# Patient Record
Sex: FEMALE | Race: WHITE | NOT HISPANIC OR LATINO | Employment: FULL TIME | URBAN - METROPOLITAN AREA
[De-identification: names, ages, dates, MRNs, and addresses within clinical notes are randomized per-mention and may not be internally consistent; named-entity substitution may affect disease eponyms.]

---

## 2018-12-30 ENCOUNTER — APPOINTMENT (OUTPATIENT)
Dept: GENERAL RADIOLOGY | Facility: CLINIC | Age: 38
End: 2018-12-30
Attending: EMERGENCY MEDICINE
Payer: COMMERCIAL

## 2018-12-30 ENCOUNTER — HOSPITAL ENCOUNTER (EMERGENCY)
Facility: CLINIC | Age: 38
Discharge: HOME | End: 2018-12-30
Attending: EMERGENCY MEDICINE | Admitting: EMERGENCY MEDICINE
Payer: COMMERCIAL

## 2018-12-30 VITALS
OXYGEN SATURATION: 94 % | BODY MASS INDEX: 43.54 KG/M2 | HEIGHT: 64 IN | TEMPERATURE: 99.1 F | DIASTOLIC BLOOD PRESSURE: 77 MMHG | SYSTOLIC BLOOD PRESSURE: 122 MMHG | RESPIRATION RATE: 34 BRPM | WEIGHT: 255 LBS | HEART RATE: 78 BPM

## 2018-12-30 DIAGNOSIS — J18.9 COMMUNITY ACQUIRED PNEUMONIA OF LEFT LOWER LOBE OF LUNG: ICD-10-CM

## 2018-12-30 LAB
ANION GAP SERPL CALCULATED.3IONS-SCNC: 8 MMOL/L (ref 3–14)
BASOPHILS # BLD AUTO: 0 10E9/L (ref 0–0.2)
BASOPHILS NFR BLD AUTO: 0.1 %
BUN SERPL-MCNC: 5 MG/DL (ref 7–30)
CALCIUM SERPL-MCNC: 9.1 MG/DL (ref 8.5–10.1)
CHLORIDE SERPL-SCNC: 101 MMOL/L (ref 94–109)
CO2 SERPL-SCNC: 29 MMOL/L (ref 20–32)
CREAT SERPL-MCNC: 0.8 MG/DL (ref 0.52–1.04)
DIFFERENTIAL METHOD BLD: NORMAL
DIFFERENTIAL METHOD BLD: NORMAL
EOSINOPHIL # BLD AUTO: 0.1 10E9/L (ref 0–0.7)
EOSINOPHIL NFR BLD AUTO: 1.8 %
ERYTHROCYTE [DISTWIDTH] IN BLOOD BY AUTOMATED COUNT: 12.9 % (ref 10–15)
ERYTHROCYTE [DISTWIDTH] IN BLOOD BY AUTOMATED COUNT: NORMAL % (ref 10–15)
FLUAV+FLUBV AG SPEC QL: NEGATIVE
FLUAV+FLUBV AG SPEC QL: NEGATIVE
GFR SERPL CREATININE-BSD FRML MDRD: >90 ML/MIN/{1.73_M2}
GLUCOSE SERPL-MCNC: 94 MG/DL (ref 70–99)
HCG UR QL: NEGATIVE
HCT VFR BLD AUTO: 38.9 % (ref 35–47)
HCT VFR BLD AUTO: NORMAL % (ref 35–47)
HGB BLD-MCNC: 13.3 G/DL (ref 11.7–15.7)
HGB BLD-MCNC: NORMAL G/DL (ref 11.7–15.7)
IMM GRANULOCYTES # BLD: 0 10E9/L (ref 0–0.4)
IMM GRANULOCYTES NFR BLD: 0.4 %
LACTATE SERPL-SCNC: 0.7 MMOL/L (ref 0.4–2)
LYMPHOCYTES # BLD AUTO: 2 10E9/L (ref 0.8–5.3)
LYMPHOCYTES NFR BLD AUTO: 29.5 %
MCH RBC QN AUTO: 28.1 PG (ref 26.5–33)
MCH RBC QN AUTO: NORMAL PG (ref 26.5–33)
MCHC RBC AUTO-ENTMCNC: 34.2 G/DL (ref 31.5–36.5)
MCHC RBC AUTO-ENTMCNC: NORMAL G/DL (ref 31.5–36.5)
MCV RBC AUTO: 82 FL (ref 78–100)
MCV RBC AUTO: NORMAL FL (ref 78–100)
MONOCYTES # BLD AUTO: 0.6 10E9/L (ref 0–1.3)
MONOCYTES NFR BLD AUTO: 9.5 %
NEUTROPHILS # BLD AUTO: 4 10E9/L (ref 1.6–8.3)
NEUTROPHILS NFR BLD AUTO: 58.7 %
NRBC # BLD AUTO: 0 10*3/UL
NRBC BLD AUTO-RTO: 0 /100
PLATELET # BLD AUTO: 189 10E9/L (ref 150–450)
PLATELET # BLD AUTO: NORMAL 10E9/L (ref 150–450)
POTASSIUM SERPL-SCNC: 3.8 MMOL/L (ref 3.4–5.3)
PROCALCITONIN SERPL-MCNC: 0.07 NG/ML
RBC # BLD AUTO: 4.73 10E12/L (ref 3.8–5.2)
RBC # BLD AUTO: NORMAL 10E12/L (ref 3.8–5.2)
SODIUM SERPL-SCNC: 138 MMOL/L (ref 133–144)
SPECIMEN SOURCE: NORMAL
WBC # BLD AUTO: 6.8 10E9/L (ref 4–11)
WBC # BLD AUTO: NORMAL 10E9/L (ref 4–11)

## 2018-12-30 PROCEDURE — 84145 PROCALCITONIN (PCT): CPT | Performed by: EMERGENCY MEDICINE

## 2018-12-30 PROCEDURE — 96365 THER/PROPH/DIAG IV INF INIT: CPT

## 2018-12-30 PROCEDURE — 85025 COMPLETE CBC W/AUTO DIFF WBC: CPT | Performed by: EMERGENCY MEDICINE

## 2018-12-30 PROCEDURE — 87804 INFLUENZA ASSAY W/OPTIC: CPT | Performed by: EMERGENCY MEDICINE

## 2018-12-30 PROCEDURE — 81025 URINE PREGNANCY TEST: CPT | Performed by: EMERGENCY MEDICINE

## 2018-12-30 PROCEDURE — 25000128 H RX IP 250 OP 636: Performed by: EMERGENCY MEDICINE

## 2018-12-30 PROCEDURE — 36415 COLL VENOUS BLD VENIPUNCTURE: CPT

## 2018-12-30 PROCEDURE — 71046 X-RAY EXAM CHEST 2 VIEWS: CPT

## 2018-12-30 PROCEDURE — 99284 EMERGENCY DEPT VISIT MOD MDM: CPT | Mod: 25

## 2018-12-30 PROCEDURE — 25000132 ZZH RX MED GY IP 250 OP 250 PS 637: Performed by: EMERGENCY MEDICINE

## 2018-12-30 PROCEDURE — 80048 BASIC METABOLIC PNL TOTAL CA: CPT | Performed by: EMERGENCY MEDICINE

## 2018-12-30 PROCEDURE — 83605 ASSAY OF LACTIC ACID: CPT | Performed by: EMERGENCY MEDICINE

## 2018-12-30 PROCEDURE — 94640 AIRWAY INHALATION TREATMENT: CPT

## 2018-12-30 PROCEDURE — 87040 BLOOD CULTURE FOR BACTERIA: CPT | Performed by: EMERGENCY MEDICINE

## 2018-12-30 PROCEDURE — 25000125 ZZHC RX 250: Performed by: EMERGENCY MEDICINE

## 2018-12-30 RX ORDER — SODIUM CHLORIDE 9 MG/ML
1000 INJECTION, SOLUTION INTRAVENOUS CONTINUOUS
Status: DISCONTINUED | OUTPATIENT
Start: 2018-12-30 | End: 2018-12-30 | Stop reason: HOSPADM

## 2018-12-30 RX ORDER — LEVOFLOXACIN 5 MG/ML
750 INJECTION, SOLUTION INTRAVENOUS ONCE
Status: COMPLETED | OUTPATIENT
Start: 2018-12-30 | End: 2018-12-30

## 2018-12-30 RX ORDER — FLUCONAZOLE 150 MG/1
150 TABLET ORAL ONCE
Status: COMPLETED | OUTPATIENT
Start: 2018-12-30 | End: 2018-12-30

## 2018-12-30 RX ORDER — CEFUROXIME AXETIL 500 MG/1
500 TABLET ORAL 2 TIMES DAILY
Status: ON HOLD | COMMUNITY
End: 2024-04-09

## 2018-12-30 RX ORDER — BENZONATATE 200 MG/1
200 CAPSULE ORAL 3 TIMES DAILY PRN
Qty: 30 CAPSULE | Refills: 0 | Status: SHIPPED | OUTPATIENT
Start: 2018-12-30 | End: 2019-01-06

## 2018-12-30 RX ORDER — ALBUTEROL SULFATE 90 UG/1
2 AEROSOL, METERED RESPIRATORY (INHALATION) EVERY 6 HOURS
COMMUNITY

## 2018-12-30 RX ORDER — IPRATROPIUM BROMIDE AND ALBUTEROL SULFATE 2.5; .5 MG/3ML; MG/3ML
3 SOLUTION RESPIRATORY (INHALATION) ONCE
Status: COMPLETED | OUTPATIENT
Start: 2018-12-30 | End: 2018-12-30

## 2018-12-30 RX ORDER — LEVOFLOXACIN 500 MG/1
500 TABLET, FILM COATED ORAL DAILY
Qty: 10 TABLET | Refills: 0 | Status: SHIPPED | OUTPATIENT
Start: 2018-12-30 | End: 2019-01-09

## 2018-12-30 RX ADMIN — FLUCONAZOLE 150 MG: 150 TABLET ORAL at 18:38

## 2018-12-30 RX ADMIN — IPRATROPIUM BROMIDE AND ALBUTEROL SULFATE 3 ML: .5; 2.5 SOLUTION RESPIRATORY (INHALATION) at 17:09

## 2018-12-30 RX ADMIN — LEVOFLOXACIN 750 MG: 5 INJECTION, SOLUTION INTRAVENOUS at 17:41

## 2018-12-30 RX ADMIN — SODIUM CHLORIDE 1000 ML: 9 INJECTION, SOLUTION INTRAVENOUS at 17:40

## 2018-12-30 ASSESSMENT — ENCOUNTER SYMPTOMS
ROS GI COMMENTS: POST TUSSIVE EMESIS
COUGH: 1
SHORTNESS OF BREATH: 1
SORE THROAT: 1
TROUBLE SWALLOWING: 0
FEVER: 1
VOICE CHANGE: 1
MYALGIAS: 0
NAUSEA: 0

## 2018-12-30 ASSESSMENT — MIFFLIN-ST. JEOR: SCORE: 1816.67

## 2018-12-30 NOTE — ED AVS SNAPSHOT
Emergency Department  [address] [place] [id number]  Phone:  [phone]  Fax:  [phone]                                    Shannon Calderon   MRN: [id number]    Department:   Emergency Department   Date of Visit:  12/30/2018           After Visit Summary Signature Page    I have received my discharge instructions, and my questions have been answered. I have discussed any challenges I see with this plan with the nurse or doctor.    ..........................................................................................................................................  Patient/Patient Representative Signature      ..........................................................................................................................................  Patient Representative Print Name and Relationship to Patient    ..................................................               ................................................  Date                                   Time    ..........................................................................................................................................  Reviewed by Signature/Title    ...................................................              ..............................................  Date                                               Time          22EPIC Rev 08/18

## 2018-12-30 NOTE — ED PROVIDER NOTES
History     Chief Complaint:  Cough     The history is provided by the patient.      Shannon Calderon is a otherwise healthy 38 year old female who presents with ongoing cough for the past 7 days. The patient states that 7 days ago, she developed a fever of 103 and a cough, which prompted her to present to Urgent care. A chest Xray at this time indicated pneumonia, and she was started on a course of cefuroxime. Since then, the patient states that her symptoms have not been improving, despite taking the antibiotics as instructed. The patient reports continued intermittent fevers of 101-102. She states that her throat feels raw from coughing and she has developed a hoarse voice over the past few days. She reports that she is becoming incontinent of urine when coughing, and she feels like she may be getting a yeast infection. She states that she has been feeling short of breath when walking about, as well as when she has severe coughing fits. The patient reports a few episodes of post tussive emesis. The patient denies recent known contact with other sick persons. She denies history of smoking or asthma.    Allergies:  No known drug allergies      Medications:    Albuterol   Cefuroxime    Past Medical History:    The patient does not have any past pertinent medical history.     Past Surgical History:    History reviewed. No pertinent surgical history.     Family History:    History reviewed. No pertinent family history.      Social History:  The patient presents to the ED with   Smoking status: No     Review of Systems   Constitutional: Positive for fever.   HENT: Positive for sore throat and voice change. Negative for ear pain and trouble swallowing.    Respiratory: Positive for cough and shortness of breath.    Cardiovascular: Negative for chest pain.   Gastrointestinal: Negative for nausea.        Post tussive emesis    Musculoskeletal: Negative for myalgias.   All other systems reviewed and are  "negative.      Physical Exam     Patient Vitals for the past 24 hrs:   BP Temp Temp src Pulse Heart Rate Resp SpO2 Height Weight   12/30/18 1745 122/77 99.1  F (37.3  C) Oral 78 79 (!) 34 94 % -- --   12/30/18 1614 132/74 -- -- 76 76 17 94 % -- --   12/30/18 1544 -- 99.4  F (37.4  C) Oral 86 86 17 95 % -- --   12/30/18 1400 149/84 99.6  F (37.6  C) Oral 100 -- 18 95 % 1.626 m (5' 4\") 115.7 kg (255 lb)        Physical Exam  Gen: Non-toxic, accompanied by significant other.  Frequent cough.    Eye:   Pupils are equal, round, and reactive.     Sclera non-injected.    ENT:   Moist mucus membranes.     Normal tongue.    Oropharynx without lesions.    Cardiac:     Normal rate and regular rhythm.    No murmurs, gallops, or rubs.    Pulmonary:     Freqeunt dry cough.   Poor air movement.     Abdomen:     Normal active bowel sounds.     Abdomen is soft and non-distended, without focal tenderness.    Musculoskeletal:     Normal movement of all extremities without evidence for deficit.    Extremities:    No edema.    Skin:   Warm and dry.    Neurologic:    Non-focal exam without asymmetric weakness or numbness.    Normal tone    Psychiatric:     Normal affect with appropriate interaction with examiner.     Emergency Department Course     Imaging:  Radiographic findings were communicated with the patient who voiced understanding of the findings.    XR Chest 2 Views  Impression: Left lower lobe pneumonia.  As read by Radiology.     Laboratory:  Labs Ordered and Resulted from Time of ED Arrival Up to the Time of Departure from the ED   BASIC METABOLIC PANEL - Abnormal; Notable for the following components:       Result Value    Urea Nitrogen 5 (*)     All other components within normal limits   CBC WITH PLATELETS DIFFERENTIAL   HCG QUALITATIVE URINE   LACTIC ACID   CBC WITH PLATELETS DIFFERENTIAL   PROCALCITONIN   PERIPHERAL IV CATHETER   INFLUENZA A/B ANTIGEN   BLOOD CULTURE       Interventions:  1709: Duoneb, 3 mL, nebulization "    1740: NS 1L IV Bolus   1741: levofloxacin infusion 750 mg, IV    Emergency Department Course:  Past medical records, nursing notes, and vitals reviewed.  1555: I performed an exam of the patient and obtained history, as documented above.  IV inserted and blood drawn.   The patient was sent for a Xray while in the emergency department, findings above.       1649: I rechecked the patient. Explained findings to the patient.    1810: I briefly discussed the patient with the hospitalist.  Given she does not appear toxic, has reassuring labs, normal vitals, and received coverage only with cefuroxime, will plan to discharge home on levaquin rather than bring in for observation.  The patient agrees with this plan, and would prefer to be at home.      Impression & Plan      Medical Decision Making:  Shannon Calderon is a 38 year old female who was started on treatment for community acquired pneumonia several days ago.  Symptoms have continued with fevers and productive cough. Chest radiograph demonstrates the left lower lobe pneumonia. She was initially slightly tachycardic with borderline oxygen saturation, but otherwise no hypotension. There is no evidence for severe sepsis at this time.  Labs are reassuring as are vitals.  Given initial partial treatment with cefuroxime, will plan to broaden coverage with levaquin and stop cefuroxime.  Patient is a good candidate for outpatient treatment, as she is otherwise healthy, tolerating oral fluids, with stable vitals and normal oxygenation.  We discussed observation in the hospital versus management of symptoms at home.  She prefers to return home.  She will return to the ED for increased shortness of breath, persistent fever, failure to improve, or any other concerns.    Diagnosis:    ICD-10-CM    1. Community acquired pneumonia of left lower lobe of lung (H) J18.1 Blood culture     Blood culture       Disposition:  Discharge to home.    Discharged with levofloxacin 500mg  po daily for 10 days and tessalon perles.    Megan Beh  12/30/2018    EMERGENCY DEPARTMENT  I, Megan Beh, am serving as a scribe at 3:55 PM on 12/30/2018 to document services personally performed by Vonnie Smith MD based on my observations and the provider's statements to me.       Vonnie Smith MD  12/31/18 0644

## 2018-12-31 NOTE — ED NOTES
"[facility]  ED Nurse Handoff Report    ED Chief complaint: Cough (x 1 week. seen at u/c. placed on antibiotics for pneumonia. last advil last night. urinary incontinence when she coughs. possible yeast infection too)      ED Diagnosis:   Final diagnoses:   None       Code Status: Full Code    Allergies: No Known Allergies    Activity level - Baseline/Home:  Independent    Activity Level - Current:   Independent     Needed?: No    Isolation: No  Infection: Not Applicable  Bariatric?: No    Vital Signs:   Vitals:    12/30/18 1400 12/30/18 1544 12/30/18 1614 12/30/18 1745   BP: 149/84  132/74 122/77   Pulse: 100 86 76 78   Resp: 18 17 17 (!) 34   Temp: 99.6  F (37.6  C) 99.4  F (37.4  C)  99.1  F (37.3  C)   TempSrc: Oral Oral  Oral   SpO2: 95% 95% 94% 94%   Weight: 115.7 kg (255 lb)      Height: 1.626 m (5' 4\")          Cardiac Rhythm: ,   Cardiac  Cardiac Rhythm: Normal sinus rhythm    Pain level:      Is this patient confused?: No   Does this patient have a guardian?  No         If yes, is there guardianship documents in the Epic \"Code/ACP\" activity?  N/A         Guardian Notified?  N/A  [place] - Suicide Severity Rating Scale Completed?  Yes  If yes, what color did the patient score?  White    Patient Report: Initial Complaint: cough  Focused Assessment: left lower lobe pneumonia- tried PO antibiotics X 1 week with no relief of symptoms. Pt has cough, low grade temp. Reports SOB on exertion.   Tests Performed:   Results for orders placed or performed during the hospital encounter of 12/30/18   XR Chest 2 Views    Narrative    XR CHEST 2 VW 12/30/2018 4:36 PM    HISTORY: Cough and fever.    COMPARISON: None.    FINDINGS: Left basilar consolidation. Right lung is clear. No effusion  or pneumothorax. Normal heart size.      Impression    IMPRESSION: Left lower lobe pneumonia.    NIKKI TORREZ MD   CBC with platelets differential   Result Value Ref Range    WBC Canceled, Test credited 4.0 - " 11.0 10e9/L    RBC Count Canceled, Test credited 3.8 - 5.2 10e12/L    Hemoglobin Canceled, Test credited 11.7 - 15.7 g/dL    Hematocrit Canceled, Test credited 35.0 - 47.0 %    MCV Canceled, Test credited 78 - 100 fl    MCH Canceled, Test credited 26.5 - 33.0 pg    MCHC Canceled, Test credited 31.5 - 36.5 g/dL    RDW Canceled, Test credited 10.0 - 15.0 %    Platelet Count Canceled, Test credited 150 - 450 10e9/L    Diff Method Canceled, Test credited    HCG qualitative urine   Result Value Ref Range    HCG Qual Urine Negative NEG^Negative   Lactic acid   Result Value Ref Range    Lactic Acid 0.7 0.4 - 2.0 mmol/L   CBC with platelets differential   Result Value Ref Range    WBC 6.8 4.0 - 11.0 10e9/L    RBC Count 4.73 3.8 - 5.2 10e12/L    Hemoglobin 13.3 11.7 - 15.7 g/dL    Hematocrit 38.9 35.0 - 47.0 %    MCV 82 78 - 100 fl    MCH 28.1 26.5 - 33.0 pg    MCHC 34.2 31.5 - 36.5 g/dL    RDW 12.9 10.0 - 15.0 %    Platelet Count 189 150 - 450 10e9/L    Diff Method Automated Method     % Neutrophils 58.7 %    % Lymphocytes 29.5 %    % Monocytes 9.5 %    % Eosinophils 1.8 %    % Basophils 0.1 %    % Immature Granulocytes 0.4 %    Nucleated RBCs 0 0 /100    Absolute Neutrophil 4.0 1.6 - 8.3 10e9/L    Absolute Lymphocytes 2.0 0.8 - 5.3 10e9/L    Absolute Monocytes 0.6 0.0 - 1.3 10e9/L    Absolute Eosinophils 0.1 0.0 - 0.7 10e9/L    Absolute Basophils 0.0 0.0 - 0.2 10e9/L    Abs Immature Granulocytes 0.0 0 - 0.4 10e9/L    Absolute Nucleated RBC 0.0    Basic metabolic panel   Result Value Ref Range    Sodium 138 133 - 144 mmol/L    Potassium 3.8 3.4 - 5.3 mmol/L    Chloride 101 94 - 109 mmol/L    Carbon Dioxide 29 20 - 32 mmol/L    Anion Gap 8 3 - 14 mmol/L    Glucose 94 70 - 99 mg/dL    Urea Nitrogen 5 (L) 7 - 30 mg/dL    Creatinine 0.80 0.52 - 1.04 mg/dL    GFR Estimate >90 >60 mL/min/[1.73_m2]    GFR Estimate If Black >90 >60 mL/min/[1.73_m2]    Calcium 9.1 8.5 - 10.1 mg/dL   Influenza A/B antigen   Result Value Ref Range     Influenza A/B Agn Specimen Nasal     Influenza A Negative NEG^Negative    Influenza B Negative NEG^Negative     Abnormal Results: see above  Treatments provided: see MAR    Family Comments:     OBS brochure/video discussed/provided to patient/family: Yes              Name of person given brochure if not patient:               Relationship to patient:     ED Medications:   Medications   0.9% sodium chloride BOLUS (1,000 mLs Intravenous New Bag 12/30/18 1740)     Followed by   sodium chloride 0.9% infusion (not administered)   levofloxacin (LEVAQUIN) infusion 750 mg (750 mg Intravenous New Bag 12/30/18 1744)   ipratropium - albuterol 0.5 mg/2.5 mg/3 mL (DUONEB) neb solution 3 mL (3 mLs Nebulization Given 12/30/18 1709)       Drips infusing?:  Yes    For the majority of the shift this patient was Green.   Interventions performed were N/A.    Severe Sepsis OR Septic Shock Diagnosis Present: No    To be done/followed up on inpatient unit:  none at  This time    ED NURSE PHONE NUMBER: [phone]

## 2019-01-05 LAB
BACTERIA SPEC CULT: NO GROWTH
BACTERIA SPEC CULT: NO GROWTH
Lab: NORMAL
Lab: NORMAL
SPECIMEN SOURCE: NORMAL
SPECIMEN SOURCE: NORMAL

## 2024-04-09 ENCOUNTER — HOSPITAL ENCOUNTER (OUTPATIENT)
Facility: CLINIC | Age: 44
Setting detail: OBSERVATION
Discharge: HOME | End: 2024-04-10
Attending: EMERGENCY MEDICINE | Admitting: SURGERY
Payer: COMMERCIAL

## 2024-04-09 ENCOUNTER — APPOINTMENT (OUTPATIENT)
Dept: CT IMAGING | Facility: CLINIC | Age: 44
End: 2024-04-09
Attending: EMERGENCY MEDICINE
Payer: COMMERCIAL

## 2024-04-09 ENCOUNTER — ANESTHESIA EVENT (OUTPATIENT)
Dept: SURGERY | Facility: CLINIC | Age: 44
End: 2024-04-09
Payer: COMMERCIAL

## 2024-04-09 ENCOUNTER — ANESTHESIA (OUTPATIENT)
Dept: SURGERY | Facility: CLINIC | Age: 44
End: 2024-04-09
Payer: COMMERCIAL

## 2024-04-09 DIAGNOSIS — Z87.19 S/P HERNIA REPAIR: ICD-10-CM

## 2024-04-09 DIAGNOSIS — K46.0 INCARCERATED HERNIA: ICD-10-CM

## 2024-04-09 DIAGNOSIS — K42.0 STRANGULATED UMBILICAL HERNIA: Primary | ICD-10-CM

## 2024-04-09 DIAGNOSIS — Z98.890 S/P HERNIA REPAIR: ICD-10-CM

## 2024-04-09 DIAGNOSIS — R10.84 GENERALIZED ABDOMINAL PAIN: ICD-10-CM

## 2024-04-09 LAB
ALBUMIN SERPL BCG-MCNC: 4.5 G/DL (ref 3.5–5.2)
ALBUMIN UR-MCNC: 50 MG/DL
ALP SERPL-CCNC: 86 U/L (ref 40–150)
ALT SERPL W P-5'-P-CCNC: 18 U/L (ref 0–50)
ANION GAP SERPL CALCULATED.3IONS-SCNC: 19 MMOL/L (ref 7–15)
APPEARANCE UR: CLEAR
AST SERPL W P-5'-P-CCNC: 27 U/L (ref 0–45)
BASOPHILS # BLD AUTO: 0 10E3/UL (ref 0–0.2)
BASOPHILS NFR BLD AUTO: 0 %
BILIRUB DIRECT SERPL-MCNC: <0.2 MG/DL (ref 0–0.3)
BILIRUB SERPL-MCNC: 0.6 MG/DL
BILIRUB UR QL STRIP: NEGATIVE
BUN SERPL-MCNC: 14.3 MG/DL (ref 6–20)
CALCIUM SERPL-MCNC: 9.7 MG/DL (ref 8.6–10)
CHLORIDE SERPL-SCNC: 98 MMOL/L (ref 98–107)
COLOR UR AUTO: YELLOW
CREAT SERPL-MCNC: 0.74 MG/DL (ref 0.51–0.95)
CREAT SERPL-MCNC: 0.86 MG/DL (ref 0.51–0.95)
DEPRECATED HCO3 PLAS-SCNC: 21 MMOL/L (ref 22–29)
EGFRCR SERPLBLD CKD-EPI 2021: 85 ML/MIN/1.73M2
EGFRCR SERPLBLD CKD-EPI 2021: >90 ML/MIN/1.73M2
EOSINOPHIL # BLD AUTO: 0.2 10E3/UL (ref 0–0.7)
EOSINOPHIL NFR BLD AUTO: 1 %
ERYTHROCYTE [DISTWIDTH] IN BLOOD BY AUTOMATED COUNT: 13.7 % (ref 10–15)
GLUCOSE SERPL-MCNC: 116 MG/DL (ref 70–99)
GLUCOSE UR STRIP-MCNC: NEGATIVE MG/DL
HCG SERPL QL: NEGATIVE
HCO3 BLDV-SCNC: 26 MMOL/L (ref 21–28)
HCT VFR BLD AUTO: 44.6 % (ref 35–47)
HGB BLD-MCNC: 15.1 G/DL (ref 11.7–15.7)
HGB UR QL STRIP: NEGATIVE
HOLD SPECIMEN: NORMAL
IMM GRANULOCYTES # BLD: 0.1 10E3/UL
IMM GRANULOCYTES NFR BLD: 1 %
KETONES UR STRIP-MCNC: 80 MG/DL
LACTATE BLD-SCNC: 1.6 MMOL/L
LEUKOCYTE ESTERASE UR QL STRIP: NEGATIVE
LIPASE SERPL-CCNC: 33 U/L (ref 13–60)
LYMPHOCYTES # BLD AUTO: 3.5 10E3/UL (ref 0.8–5.3)
LYMPHOCYTES NFR BLD AUTO: 29 %
MCH RBC QN AUTO: 27.9 PG (ref 26.5–33)
MCHC RBC AUTO-ENTMCNC: 33.9 G/DL (ref 31.5–36.5)
MCV RBC AUTO: 82 FL (ref 78–100)
MONOCYTES # BLD AUTO: 0.7 10E3/UL (ref 0–1.3)
MONOCYTES NFR BLD AUTO: 6 %
MUCOUS THREADS #/AREA URNS LPF: PRESENT /LPF
NEUTROPHILS # BLD AUTO: 7.4 10E3/UL (ref 1.6–8.3)
NEUTROPHILS NFR BLD AUTO: 63 %
NITRATE UR QL: NEGATIVE
NRBC # BLD AUTO: 0 10E3/UL
NRBC BLD AUTO-RTO: 0 /100
PCO2 BLDV: 26 MM HG (ref 40–50)
PH BLDV: 7.6 [PH] (ref 7.32–7.43)
PH UR STRIP: 8.5 [PH] (ref 5–7)
PLATELET # BLD AUTO: 340 10E3/UL (ref 150–450)
PO2 BLDV: 19 MM HG (ref 25–47)
POTASSIUM SERPL-SCNC: 3.6 MMOL/L (ref 3.4–5.3)
PROT SERPL-MCNC: 7.9 G/DL (ref 6.4–8.3)
RBC # BLD AUTO: 5.41 10E6/UL (ref 3.8–5.2)
RBC URINE: <1 /HPF
SAO2 % BLDV: 42 % (ref 70–75)
SODIUM SERPL-SCNC: 138 MMOL/L (ref 135–145)
SP GR UR STRIP: 1.01 (ref 1–1.03)
SQUAMOUS EPITHELIAL: 5 /HPF
UROBILINOGEN UR STRIP-MCNC: NORMAL MG/DL
WBC # BLD AUTO: 11.8 10E3/UL (ref 4–11)
WBC URINE: 1 /HPF

## 2024-04-09 PROCEDURE — 250N000011 HC RX IP 250 OP 636: Performed by: EMERGENCY MEDICINE

## 2024-04-09 PROCEDURE — 250N000009 HC RX 250

## 2024-04-09 PROCEDURE — 250N000011 HC RX IP 250 OP 636

## 2024-04-09 PROCEDURE — 99223 1ST HOSP IP/OBS HIGH 75: CPT | Mod: 57 | Performed by: SURGERY

## 2024-04-09 PROCEDURE — G0378 HOSPITAL OBSERVATION PER HR: HCPCS

## 2024-04-09 PROCEDURE — 250N000013 HC RX MED GY IP 250 OP 250 PS 637: Performed by: SURGERY

## 2024-04-09 PROCEDURE — 85004 AUTOMATED DIFF WBC COUNT: CPT | Performed by: EMERGENCY MEDICINE

## 2024-04-09 PROCEDURE — 96361 HYDRATE IV INFUSION ADD-ON: CPT | Mod: 59

## 2024-04-09 PROCEDURE — 250N000011 HC RX IP 250 OP 636: Performed by: SURGERY

## 2024-04-09 PROCEDURE — 250N000009 HC RX 250: Performed by: ANESTHESIOLOGY

## 2024-04-09 PROCEDURE — 250N000013 HC RX MED GY IP 250 OP 250 PS 637: Performed by: EMERGENCY MEDICINE

## 2024-04-09 PROCEDURE — 250N000025 HC SEVOFLURANE, PER MIN: Performed by: SURGERY

## 2024-04-09 PROCEDURE — 80053 COMPREHEN METABOLIC PANEL: CPT | Performed by: EMERGENCY MEDICINE

## 2024-04-09 PROCEDURE — 370N000017 HC ANESTHESIA TECHNICAL FEE, PER MIN: Performed by: SURGERY

## 2024-04-09 PROCEDURE — 258N000003 HC RX IP 258 OP 636: Performed by: SURGERY

## 2024-04-09 PROCEDURE — 83690 ASSAY OF LIPASE: CPT | Performed by: EMERGENCY MEDICINE

## 2024-04-09 PROCEDURE — 36415 COLL VENOUS BLD VENIPUNCTURE: CPT | Performed by: SURGERY

## 2024-04-09 PROCEDURE — 74177 CT ABD & PELVIS W/CONTRAST: CPT

## 2024-04-09 PROCEDURE — 250N000013 HC RX MED GY IP 250 OP 250 PS 637: Performed by: ANESTHESIOLOGY

## 2024-04-09 PROCEDURE — 96375 TX/PRO/DX INJ NEW DRUG ADDON: CPT

## 2024-04-09 PROCEDURE — 84703 CHORIONIC GONADOTROPIN ASSAY: CPT | Performed by: EMERGENCY MEDICINE

## 2024-04-09 PROCEDURE — 82803 BLOOD GASES ANY COMBINATION: CPT

## 2024-04-09 PROCEDURE — 88305 TISSUE EXAM BY PATHOLOGIST: CPT | Mod: TC | Performed by: SURGERY

## 2024-04-09 PROCEDURE — 710N000009 HC RECOVERY PHASE 1, LEVEL 1, PER MIN: Performed by: SURGERY

## 2024-04-09 PROCEDURE — 258N000003 HC RX IP 258 OP 636

## 2024-04-09 PROCEDURE — 99285 EMERGENCY DEPT VISIT HI MDM: CPT | Mod: 25

## 2024-04-09 PROCEDURE — 96376 TX/PRO/DX INJ SAME DRUG ADON: CPT | Mod: 59

## 2024-04-09 PROCEDURE — 250N000011 HC RX IP 250 OP 636: Performed by: ANESTHESIOLOGY

## 2024-04-09 PROCEDURE — 49594 RPR AA HRN 1ST 3-10 NCR/STRN: CPT | Mod: AS | Performed by: PHYSICIAN ASSISTANT

## 2024-04-09 PROCEDURE — 360N000075 HC SURGERY LEVEL 2, PER MIN: Performed by: SURGERY

## 2024-04-09 PROCEDURE — 82565 ASSAY OF CREATININE: CPT | Mod: 91 | Performed by: SURGERY

## 2024-04-09 PROCEDURE — 96374 THER/PROPH/DIAG INJ IV PUSH: CPT | Mod: 59

## 2024-04-09 PROCEDURE — 250N000009 HC RX 250: Performed by: EMERGENCY MEDICINE

## 2024-04-09 PROCEDURE — 999N000141 HC STATISTIC PRE-PROCEDURE NURSING ASSESSMENT: Performed by: SURGERY

## 2024-04-09 PROCEDURE — 272N000001 HC OR GENERAL SUPPLY STERILE: Performed by: SURGERY

## 2024-04-09 PROCEDURE — 81001 URINALYSIS AUTO W/SCOPE: CPT | Performed by: EMERGENCY MEDICINE

## 2024-04-09 PROCEDURE — 36415 COLL VENOUS BLD VENIPUNCTURE: CPT | Performed by: EMERGENCY MEDICINE

## 2024-04-09 PROCEDURE — 49594 RPR AA HRN 1ST 3-10 NCR/STRN: CPT | Performed by: SURGERY

## 2024-04-09 RX ORDER — MORPHINE SULFATE 4 MG/ML
4 INJECTION, SOLUTION INTRAMUSCULAR; INTRAVENOUS ONCE
Status: COMPLETED | OUTPATIENT
Start: 2024-04-09 | End: 2024-04-09

## 2024-04-09 RX ORDER — HYDROCHLOROTHIAZIDE 25 MG/1
1 TABLET ORAL DAILY
COMMUNITY
Start: 2023-08-04

## 2024-04-09 RX ORDER — DEXAMETHASONE SODIUM PHOSPHATE 4 MG/ML
INJECTION, SOLUTION INTRA-ARTICULAR; INTRALESIONAL; INTRAMUSCULAR; INTRAVENOUS; SOFT TISSUE PRN
Status: DISCONTINUED | OUTPATIENT
Start: 2024-04-09 | End: 2024-04-09

## 2024-04-09 RX ORDER — OXYCODONE HYDROCHLORIDE 5 MG/1
10 TABLET ORAL EVERY 4 HOURS PRN
Status: DISCONTINUED | OUTPATIENT
Start: 2024-04-09 | End: 2024-04-10 | Stop reason: HOSPADM

## 2024-04-09 RX ORDER — HYDROMORPHONE HYDROCHLORIDE 1 MG/ML
0.5 INJECTION, SOLUTION INTRAMUSCULAR; INTRAVENOUS; SUBCUTANEOUS ONCE
Status: COMPLETED | OUTPATIENT
Start: 2024-04-09 | End: 2024-04-09

## 2024-04-09 RX ORDER — SODIUM CHLORIDE, SODIUM LACTATE, POTASSIUM CHLORIDE, CALCIUM CHLORIDE 600; 310; 30; 20 MG/100ML; MG/100ML; MG/100ML; MG/100ML
INJECTION, SOLUTION INTRAVENOUS CONTINUOUS
Status: DISCONTINUED | OUTPATIENT
Start: 2024-04-09 | End: 2024-04-09 | Stop reason: HOSPADM

## 2024-04-09 RX ORDER — ONDANSETRON 4 MG/1
4 TABLET, ORALLY DISINTEGRATING ORAL EVERY 6 HOURS PRN
Status: DISCONTINUED | OUTPATIENT
Start: 2024-04-09 | End: 2024-04-10 | Stop reason: HOSPADM

## 2024-04-09 RX ORDER — OXYCODONE HYDROCHLORIDE 5 MG/1
15 TABLET ORAL EVERY 4 HOURS PRN
Status: DISCONTINUED | OUTPATIENT
Start: 2024-04-09 | End: 2024-04-10 | Stop reason: HOSPADM

## 2024-04-09 RX ORDER — PROCHLORPERAZINE MALEATE 5 MG
10 TABLET ORAL EVERY 6 HOURS PRN
Status: DISCONTINUED | OUTPATIENT
Start: 2024-04-09 | End: 2024-04-10 | Stop reason: HOSPADM

## 2024-04-09 RX ORDER — NALOXONE HYDROCHLORIDE 0.4 MG/ML
0.2 INJECTION, SOLUTION INTRAMUSCULAR; INTRAVENOUS; SUBCUTANEOUS
Status: DISCONTINUED | OUTPATIENT
Start: 2024-04-09 | End: 2024-04-10 | Stop reason: HOSPADM

## 2024-04-09 RX ORDER — LORAZEPAM 2 MG/ML
1 INJECTION INTRAMUSCULAR ONCE
Status: COMPLETED | OUTPATIENT
Start: 2024-04-09 | End: 2024-04-09

## 2024-04-09 RX ORDER — FENTANYL CITRATE 50 UG/ML
INJECTION, SOLUTION INTRAMUSCULAR; INTRAVENOUS PRN
Status: DISCONTINUED | OUTPATIENT
Start: 2024-04-09 | End: 2024-04-09

## 2024-04-09 RX ORDER — ONDANSETRON 2 MG/ML
4 INJECTION INTRAMUSCULAR; INTRAVENOUS EVERY 6 HOURS PRN
Status: DISCONTINUED | OUTPATIENT
Start: 2024-04-09 | End: 2024-04-10 | Stop reason: HOSPADM

## 2024-04-09 RX ORDER — LIDOCAINE 40 MG/G
CREAM TOPICAL
Status: DISCONTINUED | OUTPATIENT
Start: 2024-04-09 | End: 2024-04-10 | Stop reason: HOSPADM

## 2024-04-09 RX ORDER — OXYCODONE HYDROCHLORIDE 5 MG/1
5 TABLET ORAL ONCE
Status: COMPLETED | OUTPATIENT
Start: 2024-04-09 | End: 2024-04-09

## 2024-04-09 RX ORDER — CEFAZOLIN SODIUM/WATER 2 G/20 ML
2 SYRINGE (ML) INTRAVENOUS
Status: COMPLETED | OUTPATIENT
Start: 2024-04-09 | End: 2024-04-09

## 2024-04-09 RX ORDER — IOPAMIDOL 755 MG/ML
500 INJECTION, SOLUTION INTRAVASCULAR ONCE
Status: COMPLETED | OUTPATIENT
Start: 2024-04-09 | End: 2024-04-09

## 2024-04-09 RX ORDER — LIDOCAINE 40 MG/G
CREAM TOPICAL
Status: DISCONTINUED | OUTPATIENT
Start: 2024-04-09 | End: 2024-04-09 | Stop reason: HOSPADM

## 2024-04-09 RX ORDER — ACETAMINOPHEN 325 MG/1
975 TABLET ORAL ONCE
Status: COMPLETED | OUTPATIENT
Start: 2024-04-09 | End: 2024-04-09

## 2024-04-09 RX ORDER — METHADONE HYDROCHLORIDE 10 MG/ML
2 INJECTION, SOLUTION INTRAMUSCULAR; INTRAVENOUS; SUBCUTANEOUS 3 TIMES DAILY PRN
Status: DISCONTINUED | OUTPATIENT
Start: 2024-04-09 | End: 2024-04-09 | Stop reason: HOSPADM

## 2024-04-09 RX ORDER — HYDRALAZINE HYDROCHLORIDE 20 MG/ML
10 INJECTION INTRAMUSCULAR; INTRAVENOUS EVERY 10 MIN PRN
Status: DISCONTINUED | OUTPATIENT
Start: 2024-04-09 | End: 2024-04-09 | Stop reason: HOSPADM

## 2024-04-09 RX ORDER — SODIUM CHLORIDE, SODIUM LACTATE, POTASSIUM CHLORIDE, CALCIUM CHLORIDE 600; 310; 30; 20 MG/100ML; MG/100ML; MG/100ML; MG/100ML
INJECTION, SOLUTION INTRAVENOUS CONTINUOUS PRN
Status: DISCONTINUED | OUTPATIENT
Start: 2024-04-09 | End: 2024-04-09

## 2024-04-09 RX ORDER — ONDANSETRON 4 MG/1
4 TABLET, ORALLY DISINTEGRATING ORAL EVERY 30 MIN PRN
Status: DISCONTINUED | OUTPATIENT
Start: 2024-04-09 | End: 2024-04-09 | Stop reason: HOSPADM

## 2024-04-09 RX ORDER — ALBUTEROL SULFATE 0.83 MG/ML
2.5 SOLUTION RESPIRATORY (INHALATION) EVERY 4 HOURS PRN
Status: DISCONTINUED | OUTPATIENT
Start: 2024-04-09 | End: 2024-04-09 | Stop reason: HOSPADM

## 2024-04-09 RX ORDER — CEFAZOLIN SODIUM/WATER 2 G/20 ML
2 SYRINGE (ML) INTRAVENOUS SEE ADMIN INSTRUCTIONS
Status: DISCONTINUED | OUTPATIENT
Start: 2024-04-09 | End: 2024-04-09 | Stop reason: HOSPADM

## 2024-04-09 RX ORDER — KETOROLAC TROMETHAMINE 15 MG/ML
15 INJECTION, SOLUTION INTRAMUSCULAR; INTRAVENOUS EVERY 6 HOURS
Status: COMPLETED | OUTPATIENT
Start: 2024-04-09 | End: 2024-04-10

## 2024-04-09 RX ORDER — HYDROMORPHONE HCL IN WATER/PF 6 MG/30 ML
0.2 PATIENT CONTROLLED ANALGESIA SYRINGE INTRAVENOUS
Status: DISCONTINUED | OUTPATIENT
Start: 2024-04-09 | End: 2024-04-10 | Stop reason: HOSPADM

## 2024-04-09 RX ORDER — PROPOFOL 10 MG/ML
INJECTION, EMULSION INTRAVENOUS PRN
Status: DISCONTINUED | OUTPATIENT
Start: 2024-04-09 | End: 2024-04-09

## 2024-04-09 RX ORDER — ONDANSETRON 2 MG/ML
INJECTION INTRAMUSCULAR; INTRAVENOUS PRN
Status: DISCONTINUED | OUTPATIENT
Start: 2024-04-09 | End: 2024-04-09

## 2024-04-09 RX ORDER — ALBUTEROL SULFATE 90 UG/1
2 AEROSOL, METERED RESPIRATORY (INHALATION) EVERY 6 HOURS
Status: DISCONTINUED | OUTPATIENT
Start: 2024-04-09 | End: 2024-04-10 | Stop reason: HOSPADM

## 2024-04-09 RX ORDER — SODIUM CHLORIDE, SODIUM LACTATE, POTASSIUM CHLORIDE, CALCIUM CHLORIDE 600; 310; 30; 20 MG/100ML; MG/100ML; MG/100ML; MG/100ML
INJECTION, SOLUTION INTRAVENOUS CONTINUOUS
Status: DISCONTINUED | OUTPATIENT
Start: 2024-04-09 | End: 2024-04-10 | Stop reason: HOSPADM

## 2024-04-09 RX ORDER — BUPIVACAINE HYDROCHLORIDE 5 MG/ML
INJECTION, SOLUTION EPIDURAL; INTRACAUDAL PRN
Status: DISCONTINUED | OUTPATIENT
Start: 2024-04-09 | End: 2024-04-09 | Stop reason: HOSPADM

## 2024-04-09 RX ORDER — NALOXONE HYDROCHLORIDE 0.4 MG/ML
0.1 INJECTION, SOLUTION INTRAMUSCULAR; INTRAVENOUS; SUBCUTANEOUS
Status: DISCONTINUED | OUTPATIENT
Start: 2024-04-09 | End: 2024-04-09 | Stop reason: HOSPADM

## 2024-04-09 RX ORDER — MAGNESIUM SULFATE HEPTAHYDRATE 40 MG/ML
2 INJECTION, SOLUTION INTRAVENOUS
Status: DISCONTINUED | OUTPATIENT
Start: 2024-04-09 | End: 2024-04-09 | Stop reason: HOSPADM

## 2024-04-09 RX ORDER — KETOROLAC TROMETHAMINE 15 MG/ML
15 INJECTION, SOLUTION INTRAMUSCULAR; INTRAVENOUS
Status: DISCONTINUED | OUTPATIENT
Start: 2024-04-09 | End: 2024-04-09 | Stop reason: HOSPADM

## 2024-04-09 RX ORDER — LIDOCAINE HYDROCHLORIDE 20 MG/ML
INJECTION, SOLUTION INFILTRATION; PERINEURAL PRN
Status: DISCONTINUED | OUTPATIENT
Start: 2024-04-09 | End: 2024-04-09

## 2024-04-09 RX ORDER — PROPOFOL 10 MG/ML
INJECTION, EMULSION INTRAVENOUS CONTINUOUS PRN
Status: DISCONTINUED | OUTPATIENT
Start: 2024-04-09 | End: 2024-04-09

## 2024-04-09 RX ORDER — ENOXAPARIN SODIUM 100 MG/ML
40 INJECTION SUBCUTANEOUS EVERY 24 HOURS
Status: DISCONTINUED | OUTPATIENT
Start: 2024-04-10 | End: 2024-04-10 | Stop reason: HOSPADM

## 2024-04-09 RX ORDER — ONDANSETRON 2 MG/ML
4 INJECTION INTRAMUSCULAR; INTRAVENOUS EVERY 30 MIN PRN
Status: DISCONTINUED | OUTPATIENT
Start: 2024-04-09 | End: 2024-04-09 | Stop reason: HOSPADM

## 2024-04-09 RX ORDER — NALOXONE HYDROCHLORIDE 0.4 MG/ML
0.4 INJECTION, SOLUTION INTRAMUSCULAR; INTRAVENOUS; SUBCUTANEOUS
Status: DISCONTINUED | OUTPATIENT
Start: 2024-04-09 | End: 2024-04-10 | Stop reason: HOSPADM

## 2024-04-09 RX ORDER — HYDROMORPHONE HCL IN WATER/PF 6 MG/30 ML
0.4 PATIENT CONTROLLED ANALGESIA SYRINGE INTRAVENOUS
Status: DISCONTINUED | OUTPATIENT
Start: 2024-04-09 | End: 2024-04-10 | Stop reason: HOSPADM

## 2024-04-09 RX ORDER — GLYCOPYRROLATE 0.2 MG/ML
INJECTION, SOLUTION INTRAMUSCULAR; INTRAVENOUS PRN
Status: DISCONTINUED | OUTPATIENT
Start: 2024-04-09 | End: 2024-04-09

## 2024-04-09 RX ORDER — LABETALOL HYDROCHLORIDE 5 MG/ML
10 INJECTION, SOLUTION INTRAVENOUS
Status: DISCONTINUED | OUTPATIENT
Start: 2024-04-09 | End: 2024-04-09 | Stop reason: HOSPADM

## 2024-04-09 RX ORDER — HYDROCHLOROTHIAZIDE 25 MG/1
25 TABLET ORAL DAILY
Status: DISCONTINUED | OUTPATIENT
Start: 2024-04-10 | End: 2024-04-10 | Stop reason: HOSPADM

## 2024-04-09 RX ORDER — CALCIUM CARBONATE 500 MG/1
500 TABLET, CHEWABLE ORAL 4 TIMES DAILY PRN
Status: DISCONTINUED | OUTPATIENT
Start: 2024-04-09 | End: 2024-04-10 | Stop reason: HOSPADM

## 2024-04-09 RX ORDER — ONDANSETRON 2 MG/ML
4 INJECTION INTRAMUSCULAR; INTRAVENOUS ONCE
Status: COMPLETED | OUTPATIENT
Start: 2024-04-09 | End: 2024-04-09

## 2024-04-09 RX ORDER — ALBUTEROL SULFATE 0.83 MG/ML
2.5 SOLUTION RESPIRATORY (INHALATION)
Status: DISCONTINUED | OUTPATIENT
Start: 2024-04-09 | End: 2024-04-10 | Stop reason: HOSPADM

## 2024-04-09 RX ADMIN — GLYCOPYRROLATE 0.2 MG: 0.2 INJECTION, SOLUTION INTRAMUSCULAR; INTRAVENOUS at 18:53

## 2024-04-09 RX ADMIN — PROPOFOL 200 MG: 10 INJECTION, EMULSION INTRAVENOUS at 18:53

## 2024-04-09 RX ADMIN — SODIUM CHLORIDE, POTASSIUM CHLORIDE, SODIUM LACTATE AND CALCIUM CHLORIDE: 600; 310; 30; 20 INJECTION, SOLUTION INTRAVENOUS at 21:57

## 2024-04-09 RX ADMIN — ONDANSETRON 4 MG: 2 INJECTION INTRAMUSCULAR; INTRAVENOUS at 18:53

## 2024-04-09 RX ADMIN — LORAZEPAM 1 MG: 2 INJECTION INTRAMUSCULAR; INTRAVENOUS at 12:54

## 2024-04-09 RX ADMIN — FENTANYL CITRATE 50 MCG: 50 INJECTION INTRAMUSCULAR; INTRAVENOUS at 19:05

## 2024-04-09 RX ADMIN — ONDANSETRON 4 MG: 2 INJECTION INTRAMUSCULAR; INTRAVENOUS at 21:19

## 2024-04-09 RX ADMIN — ALBUTEROL SULFATE 2.5 MG: 2.5 SOLUTION RESPIRATORY (INHALATION) at 20:00

## 2024-04-09 RX ADMIN — PROCHLORPERAZINE EDISYLATE 10 MG: 5 INJECTION INTRAMUSCULAR; INTRAVENOUS at 22:12

## 2024-04-09 RX ADMIN — MORPHINE SULFATE 4 MG: 4 INJECTION, SOLUTION INTRAMUSCULAR; INTRAVENOUS at 12:42

## 2024-04-09 RX ADMIN — DEXAMETHASONE SODIUM PHOSPHATE 8 MG: 4 INJECTION, SOLUTION INTRA-ARTICULAR; INTRALESIONAL; INTRAMUSCULAR; INTRAVENOUS; SOFT TISSUE at 18:53

## 2024-04-09 RX ADMIN — MIDAZOLAM 2 MG: 1 INJECTION INTRAMUSCULAR; INTRAVENOUS at 18:42

## 2024-04-09 RX ADMIN — ACETAMINOPHEN 975 MG: 325 TABLET, FILM COATED ORAL at 16:37

## 2024-04-09 RX ADMIN — HYDROMORPHONE HYDROCHLORIDE 0.5 MG: 1 INJECTION, SOLUTION INTRAMUSCULAR; INTRAVENOUS; SUBCUTANEOUS at 20:17

## 2024-04-09 RX ADMIN — SODIUM CHLORIDE, POTASSIUM CHLORIDE, SODIUM LACTATE AND CALCIUM CHLORIDE: 600; 310; 30; 20 INJECTION, SOLUTION INTRAVENOUS at 18:05

## 2024-04-09 RX ADMIN — FENTANYL CITRATE 100 MCG: 50 INJECTION INTRAMUSCULAR; INTRAVENOUS at 18:53

## 2024-04-09 RX ADMIN — LIDOCAINE HYDROCHLORIDE 50 MG: 20 INJECTION, SOLUTION INFILTRATION; PERINEURAL at 18:53

## 2024-04-09 RX ADMIN — ROCURONIUM BROMIDE 50 MG: 50 INJECTION, SOLUTION INTRAVENOUS at 18:53

## 2024-04-09 RX ADMIN — SUGAMMADEX 200 MG: 100 INJECTION, SOLUTION INTRAVENOUS at 19:33

## 2024-04-09 RX ADMIN — SODIUM CHLORIDE 100 ML: 9 INJECTION, SOLUTION INTRAVENOUS at 13:34

## 2024-04-09 RX ADMIN — HYDROMORPHONE HYDROCHLORIDE 0.5 MG: 1 INJECTION, SOLUTION INTRAMUSCULAR; INTRAVENOUS; SUBCUTANEOUS at 13:23

## 2024-04-09 RX ADMIN — SODIUM CHLORIDE, POTASSIUM CHLORIDE, SODIUM LACTATE AND CALCIUM CHLORIDE: 600; 310; 30; 20 INJECTION, SOLUTION INTRAVENOUS at 19:53

## 2024-04-09 RX ADMIN — ALBUTEROL SULFATE 2 PUFF: 90 AEROSOL, METERED RESPIRATORY (INHALATION) at 23:12

## 2024-04-09 RX ADMIN — Medication 2 G: at 18:49

## 2024-04-09 RX ADMIN — KETOROLAC TROMETHAMINE 15 MG: 15 INJECTION, SOLUTION INTRAMUSCULAR; INTRAVENOUS at 22:09

## 2024-04-09 RX ADMIN — ONDANSETRON 4 MG: 2 INJECTION INTRAMUSCULAR; INTRAVENOUS at 12:36

## 2024-04-09 RX ADMIN — IOPAMIDOL 100 ML: 755 INJECTION, SOLUTION INTRAVENOUS at 13:34

## 2024-04-09 RX ADMIN — PROPOFOL 50 MCG/KG/MIN: 10 INJECTION, EMULSION INTRAVENOUS at 19:03

## 2024-04-09 ASSESSMENT — ACTIVITIES OF DAILY LIVING (ADL)
ADLS_ACUITY_SCORE: 35
ADLS_ACUITY_SCORE: 25
ADLS_ACUITY_SCORE: 35
ADLS_ACUITY_SCORE: 20
ADLS_ACUITY_SCORE: 35

## 2024-04-09 ASSESSMENT — COLUMBIA-SUICIDE SEVERITY RATING SCALE - C-SSRS
6. HAVE YOU EVER DONE ANYTHING, STARTED TO DO ANYTHING, OR PREPARED TO DO ANYTHING TO END YOUR LIFE?: NO
2. HAVE YOU ACTUALLY HAD ANY THOUGHTS OF KILLING YOURSELF IN THE PAST MONTH?: NO
1. IN THE PAST MONTH, HAVE YOU WISHED YOU WERE DEAD OR WISHED YOU COULD GO TO SLEEP AND NOT WAKE UP?: NO

## 2024-04-09 NOTE — ED TRIAGE NOTES
Pt arrives via EMS from [facility] for RLQ abdominal pain that began around two hours ago. Pt states she was at checkup when she coughed really hard and the pain started. Ibuprofen taken after pain began. LMP on the 1st per pt. History of hernia in that area that pt was told to lose 40lb for before reassessment and possible surgery.

## 2024-04-09 NOTE — ED PROVIDER NOTES
"    History     Chief Complaint:  Abdominal Pain       HPI   Shannon Calderon is a 43 year old female presenting with acute onset abdominal pain.  She has a history of a hernia, which she states the surgeon does not want to fix until she loses more weight.  She states she normally has a bulge in her abdomen that is not reducible.  Earlier today, she \"coughed really hard\" and immediately after had severe abdominal pain.  The pain is worse around the hernia and the right lower quadrant.  She endorses nausea but denies vomiting.  No fever, chills, chest pain, shortness of breath, hematuria, dysuria, constipation, diarrhea.      Independent Historian:    Patient only    Review of External Notes:  4/9/24: UC note reviewed      Medications:    No current outpatient medications on file.      Past Medical History:    No past medical history on file.    Past Surgical History:    History reviewed. No pertinent surgical history.       Physical Exam   Patient Vitals for the past 24 hrs:   BP Temp Temp src Pulse Resp SpO2 Height Weight   04/09/24 1845 -- -- -- -- -- 94 % -- --   04/09/24 1830 116/74 -- -- 66 16 96 % -- --   04/09/24 1815 118/78 -- -- 65 -- 95 % -- --   04/09/24 1800 118/77 -- -- 68 -- 96 % -- --   04/09/24 1745 122/82 -- -- -- 14 98 % -- --   04/09/24 1740 -- -- -- -- -- 97 % -- --   04/09/24 1610 137/89 97  F (36.1  C) -- 71 16 99 % 1.626 m (5' 4\") 111.1 kg (245 lb)   04/09/24 1518 (!) 142/98 -- -- -- -- 100 % -- --   04/09/24 1456 139/89 -- -- -- -- 100 % -- --   04/09/24 1442 133/85 -- -- 55 -- 100 % -- --   04/09/24 1427 137/85 -- -- -- -- 98 % -- --   04/09/24 1419 -- -- -- -- -- 95 % -- --   04/09/24 1418 -- -- -- -- -- (!) 89 % -- --   04/09/24 1350 -- -- -- -- -- 98 % -- --   04/09/24 1349 (!) 150/89 -- -- 62 -- -- -- --   04/09/24 1328 -- 97.4  F (36.3  C) Oral 60 18 -- -- --   04/09/24 1327 -- -- -- -- -- 100 % -- --   04/09/24 1322 -- -- -- -- -- 99 % -- --   04/09/24 1321 -- -- -- -- -- 100 % -- " --   04/09/24 1320 (!) 141/70 -- -- 61 -- 100 % -- --   04/09/24 1317 -- -- -- -- -- 98 % -- --   04/09/24 1311 -- -- -- -- 17 -- -- --   04/09/24 1257 -- -- -- -- -- 99 % -- --   04/09/24 1246 -- -- -- -- -- 100 % -- --   04/09/24 1244 (!) 138/98 -- -- 71 -- 98 % -- --        Physical Exam  General: In acute distress, moaning in pain  Head: No obvious trauma to head.  Ears, Nose, Throat:  External ears normal.  Nose normal.  No pharyngeal erythema, swelling or exudate.  Midline uvula. Moist mucus membranes.  Eyes:  Conjunctivae clear.   Neck: Normal range of motion.  Neck supple.   CV: Regular rate and rhythm.  No murmurs.      Respiratory: Effort normal and breath sounds normal.  No wheezing or crackles.   Gastrointestinal: Soft.  Large periumbilical hernia appreciated that is non-reducible with severe tenderness to palpation.  There is no rigidity, no rebound and no guarding.   Musculoskeletal: Normal range of motion.  Non tender extremities to palpations. No lower extremity edema  Neuro: Alert. Moving all extremities appropriately.  Normal speech.    Skin: Skin is warm and dry.  No rash noted.   Psych: Normal mood and affect. Behavior is normal.       Emergency Department Course     Imaging:  CT Abdomen Pelvis w Contrast   Final Result   IMPRESSION:    1.  Large midline supraumbilical ventral abdominal hernia containing a   few nondilated but hypoenhancing/edematous small bowel loops, fat and   fluid. The bowel loops may be strangulated within the hernia. Consider   surgical consultation.   2.  Mild tree-in-bud micronodular opacities in the left lower lobe,   the distal atypical pneumonia.      SAMY KOENIG MD            SYSTEM ID:  [id number]          Laboratory:  Labs Ordered and Resulted from Time of ED Arrival to Time of ED Departure   BASIC METABOLIC PANEL - Abnormal       Result Value    Sodium 138      Potassium 3.6      Chloride 98      Carbon Dioxide (CO2) 21 (*)     Anion Gap 19 (*)     Urea Nitrogen  14.3      Creatinine 0.86      GFR Estimate 85      Calcium 9.7      Glucose 116 (*)    ROUTINE UA WITH MICROSCOPIC REFLEX TO CULTURE - Abnormal    Color Urine Yellow      Appearance Urine Clear      Glucose Urine Negative      Bilirubin Urine Negative      Ketones Urine 80 (*)     Specific Gravity Urine 1.010      Blood Urine Negative      pH Urine 8.5 (*)     Protein Albumin Urine 50 (*)     Urobilinogen Urine Normal      Nitrite Urine Negative      Leukocyte Esterase Urine Negative      Mucus Urine Present (*)     RBC Urine <1      WBC Urine 1      Squamous Epithelials Urine 5 (*)    CBC WITH PLATELETS AND DIFFERENTIAL - Abnormal    WBC Count 11.8 (*)     RBC Count 5.41 (*)     Hemoglobin 15.1      Hematocrit 44.6      MCV 82      MCH 27.9      MCHC 33.9      RDW 13.7      Platelet Count 340      % Neutrophils 63      % Lymphocytes 29      % Monocytes 6      % Eosinophils 1      % Basophils 0      % Immature Granulocytes 1      NRBCs per 100 WBC 0      Absolute Neutrophils 7.4      Absolute Lymphocytes 3.5      Absolute Monocytes 0.7      Absolute Eosinophils 0.2      Absolute Basophils 0.0      Absolute Immature Granulocytes 0.1      Absolute NRBCs 0.0     ISTAT GASES LACTATE VENOUS POCT - Abnormal    Lactic Acid POCT 1.6      Bicarbonate Venous POCT 26      O2 Sat, Venous POCT 42 (*)     pCO2 Venous POCT 26 (*)     pH Venous POCT 7.60 (*)     pO2 Venous POCT 19 (*)    HEPATIC FUNCTION PANEL - Normal    Protein Total 7.9      Albumin 4.5      Bilirubin Total 0.6      Alkaline Phosphatase 86      AST 27      ALT 18      Bilirubin Direct <0.20     LIPASE - Normal    Lipase 33     HCG QUALITATIVE PREGNANCY - Normal    hCG Serum Qualitative Negative          Procedures   NA    Emergency Department Course & Assessments:    Interventions:  Medications   sodium chloride 0.9 % bag 100 mL for CT scan flush use ( As instructed Auto Hold 4/9/24 4765)   ceFAZolin Sodium (ANCEF) injection 2 g (has no administration in time  range)   lidocaine 1 % 0.1-1 mL (has no administration in time range)   lidocaine (LMX4) cream (has no administration in time range)   sodium chloride (PF) 0.9% PF flush 3 mL (has no administration in time range)   sodium chloride (PF) 0.9% PF flush 3 mL (has no administration in time range)   lactated ringers infusion (has no administration in time range)   BUPivacaine (MARCAINE) 0.5% preservative free injection (30 mLs Intradermal $Given 4/9/24 1933)   oxyCODONE (ROXICODONE) tablet 5 mg (5 mg Oral Not Given 4/9/24 1236)   ondansetron (ZOFRAN) injection 4 mg (4 mg Intravenous $Given 4/9/24 1236)   morphine (PF) injection 4 mg (4 mg Intravenous $Given 4/9/24 1242)   LORazepam (ATIVAN) injection 1 mg (1 mg Intravenous $Given 4/9/24 1254)   HYDROmorphone (PF) (DILAUDID) injection 0.5 mg (0.5 mg Intravenous $Given 4/9/24 1323)   iopamidol (ISOVUE-370) solution 500 mL (100 mLs Intravenous $Given 4/9/24 1334)   ceFAZolin Sodium (ANCEF) injection 2 g (2 g Intravenous Canceled Entry 4/9/24 1855)   acetaminophen (TYLENOL) tablet 975 mg (975 mg Oral $Given 4/9/24 1637)        Assessments:  1210 initial evaluation and assessment of patient    Independent Interpretation (X-rays, CTs, rhythm strip):  None    Consultations/Discussion of Management or Tests:  I spoke to Dr. Aragon, general surgery       Social Determinants of Health affecting care:  None     Disposition:  The patient was transferred to the OR under the care of Dr. Aragon    Impression & Plan    CMS Diagnoses: None      Medical Decision Making:  Patient presents with acute onset abdominal pain.  She has a known hernia, but abdominal pain worsened after she coughed violently.  She is in severe pain.  The hernia does not appear dusky on appearance, but I am unable to reduce  it.  Lactate is not elevated.  Vital signs remained stable.  Blood work, other than a mild leukocytosis, is unremarkable.  She is given morphine, Ativan and Zofran.  Her pain is mildly  improved, she still appears extremely uncomfortable.  She is given a dose of Dilaudid.  CT scan shows a supraumbilical hernia with edematous small bowel loops.  There is concern for strangulation, so general surgery was consulted.  I discussed the patient with general surgery, they report they will take patient to the operating room.  I updated the patient.  She is agreeable with this plan, and reports her pain is still there, but is manageable.  She remains in stable condition and is transferred to the operating room.        Diagnosis:    ICD-10-CM    1. Strangulated umbilical hernia  K42.0 Case Request: HERNIORRHAPHY, UMBILICAL, OPEN  POSSIBLE SMALL BOWEL OBSTRUCTION     Case Request: HERNIORRHAPHY, UMBILICAL, OPEN  POSSIBLE SMALL BOWEL OBSTRUCTION      2. Incarcerated hernia  K46.0       3. Generalized abdominal pain  R10.84            Discharge Medications:  Current Discharge Medication List             4/9/2024   Harris Do MD Peery, Stephen, MD  04/09/24 2022

## 2024-04-09 NOTE — CONSULTS
General Surgery Consultation    Shannon Calderon MRN# [id number]   Age: 43 year old YOB: 1980     Date of Admission:  4/9/2024    Reason for consult:            Incarcerated, possible strangulated epigastric hernia.       Requesting physician:            ED provider Dr Do.                Assessment and Plan:   Assessment:   Shannon Calderon is a 43 year old female with periumbilical abdominal pain and nausea due to an incarcerated hernia.    Comorbidities:   has no past medical history on file.      Plan:   Offered and recommended exploration and reduction with primary repair of her hernia. Discussed potential for small bowel resection. Also discussed potential recurrence. Recovery reviewed as well. She consents to proceed.                 Chief Complaint:   Abdominal pain, periumbilical     History is obtained from the patient         History of Present Illness:   Shannon Calderon is a 43 year old female who presents with acute periumbilical abdominal pain which began today after she had a forceful cough. She has had a recognized supraumbilical hernia for years and has seen a surgeon regarding repair in the past. She has been advised to lose weight and has gotten detention to her goal. She has had some nausea and increased firmness at the hernia site. She has not had any other abdominal operations.              Past Medical History:    has no past medical history on file.          Past Surgical History:   History reviewed. No pertinent surgical history.          Social History:     Social History     Tobacco Use    Smoking status: Not on file    Smokeless tobacco: Not on file   Substance Use Topics    Alcohol use: Not on file             Family History:   Family history reviewed and not pertinent.         Allergies:   No Known Allergies          Medications:     Current Facility-Administered Medications   Medication Dose Route Frequency Provider Last Rate Last Admin    ceFAZolin Sodium  "(ANCEF) injection 2 g  2 g Intravenous Pre-Op/Pre-procedure x 1 dose Hector Aragon MD        ceFAZolin Sodium (ANCEF) injection 2 g  2 g Intravenous See Admin Instructions Hector Aragon MD        lactated ringers infusion   Intravenous Continuous Chuckie Ward MD        lidocaine (LMX4) cream   Topical Q1H PRN Chuckie Ward MD        lidocaine 1 % 0.1-1 mL  0.1-1 mL Other Q1H PRN Chuckie Ward MD        sodium chloride (PF) 0.9% PF flush 3 mL  3 mL Intracatheter Q8H Chuckie Ward MD        sodium chloride (PF) 0.9% PF flush 3 mL  3 mL Intracatheter q1 min prn Chuckie Ward MD        [Auto Hold] sodium chloride 0.9 % bag 100 mL for CT scan flush use  100 mL As instructed Q1H PRN Harris Do MD   100 mL at 04/09/24 1334     Current Facility-Administered Medications   Medication Dose Route Frequency Provider Last Rate Last Admin    ceFAZolin Sodium (ANCEF) injection 2 g  2 g Intravenous Pre-Op/Pre-procedure x 1 dose Hector Aragon MD        ceFAZolin Sodium (ANCEF) injection 2 g  2 g Intravenous See Admin Instructions Hector Aragon MD        sodium chloride (PF) 0.9% PF flush 3 mL  3 mL Intracatheter Q8H Chuckie Ward MD                Review of Systems:   The Review of Systems is negative other than noted in the HPI.          Physical Exam:   /89   Pulse 71   Temp 97  F (36.1  C)   Resp 16   Ht 1.626 m (5' 4\")   Wt 111.1 kg (245 lb)   LMP 04/01/2024 (Approximate)   SpO2 99%   BMI 42.05 kg/m    General - Well developed, well nourished female in no apparent distress  HEENT:  Head normocephalic and atraumatic, pupils equal and round, conjunctivae clear, no scleral icterus, mucous membranes moist, external ears and nose normal  Neck: Supple without thyromegaly or masses  Lymphatic: No cervical, or supraclavicular lymphadenopathy  Lungs: Clear to auscultation bilaterally  Heart: regular rate and rhythm, no " murmurs  Abdomen:   soft, flat, non-distended with moderate tenderness noted over a firm lump in the epigastrium which is about the size of a lime.  Extremities: Warm without edema  Neurologic: alert, speech is clear, moves all extremities with good strength  Psychiatric: Mood and affect appropriate  Skin: Without lesions, rashes, or juandice         Data:     Lab Results   Component Value Date    WBC 11.8 04/09/2024    WBC 6.8 12/30/2018     Lab Results   Component Value Date    HGB 15.1 04/09/2024    HGB 13.3 12/30/2018     Lab Results   Component Value Date     04/09/2024     12/30/2018     Last Basic Metabolic Panel:  Lab Results   Component Value Date     04/09/2024     12/30/2018      Lab Results   Component Value Date    POTASSIUM 3.6 04/09/2024    POTASSIUM 3.8 12/30/2018     Lab Results   Component Value Date    CHLORIDE 98 04/09/2024    CHLORIDE 101 12/30/2018     Lab Results   Component Value Date    ALEJANDRINA 9.7 04/09/2024    ALEJANDRINA 9.1 12/30/2018     Lab Results   Component Value Date    CO2 21 04/09/2024    CO2 29 12/30/2018     Lab Results   Component Value Date    BUN 14.3 04/09/2024    BUN 5 12/30/2018     Lab Results   Component Value Date    CR 0.86 04/09/2024    CR 0.80 12/30/2018     Lab Results   Component Value Date     04/09/2024    GLC 94 12/30/2018       Liver Function Studies -   Recent Labs   Lab Test 04/09/24  1232   PROTTOTAL 7.9   ALBUMIN 4.5   BILITOTAL 0.6   ALKPHOS 86   AST 27   ALT 18       Imaging:  All imaging studies reviewed by me.    Results for orders placed or performed during the hospital encounter of 04/09/24   CT Abdomen Pelvis w Contrast    Narrative    CT ABDOMEN PELVIS W CONTRAST 4/9/2024 1:41 PM    CLINICAL HISTORY: hx of hernia, coughed today which resulted in severe  ab pain    TECHNIQUE: CT scan of the abdomen and pelvis was performed following  injection of IV contrast. Multiplanar reformats were obtained. Dose  reduction techniques were  used.  CONTRAST: 100mL Isovue-370    COMPARISON: None.    FINDINGS:   LOWER CHEST: Mild tree-in-bud micronodular opacities in the left lower  lobe. Linear atelectasis in the right upper lobe and left lower lobe.    HEPATOBILIARY: Normal.    PANCREAS: Normal.    SPLEEN: Normal.    ADRENAL GLANDS: Normal.    KIDNEYS/BLADDER: Normal.    BOWEL: No small bowel or colonic obstruction. Normal appendix. Midline  supraumbilical ventral abdominal hernia with the hernia sac measuring  8.7 x 8.7 x 8.5 cm and the hernia mouth measuring 3.0 x 2.5 cm. There  are a few nondilated but hypoenhancing loops of small bowel within the  hernia sac, a small amount of fluid and fat within the hernia sac.  Findings could indicate strangulated small bowel loops within the  hernia.    PELVIC ORGANS: No pelvic masses.    ADDITIONAL FINDINGS: No lymphadenopathy. No free fluid or fluid  collections in the abdomen and pelvis. No extraluminal air.    MUSCULOSKELETAL: Unremarkable.      Impression    IMPRESSION:   1.  Large midline supraumbilical ventral abdominal hernia containing a  few nondilated but hypoenhancing/edematous small bowel loops, fat and  fluid. The bowel loops may be strangulated within the hernia. Consider  surgical consultation.  2.  Mild tree-in-bud micronodular opacities in the left lower lobe,  the distal atypical pneumonia.    SAMY KOENIG MD         SYSTEM ID:  [id number]          Hector Aragon MD

## 2024-04-09 NOTE — ANESTHESIA PREPROCEDURE EVALUATION
Anesthesia Pre-Procedure Evaluation    Patient: Shannon Calderon   MRN: [id number] : 1980        Procedure : Procedure(s):  HERNIORRHAPHY, UMBILICAL, OPEN POSSIBLE SMALL BOWEL OBSTRUCTION          No past medical history on file.   History reviewed. No pertinent surgical history.   No Known Allergies   Social History     Tobacco Use    Smoking status: Not on file    Smokeless tobacco: Not on file   Substance Use Topics    Alcohol use: Not on file      Wt Readings from Last 1 Encounters:   18 115.7 kg (255 lb)        Anesthesia Evaluation            ROS/MED HX  ENT/Pulmonary:  - neg pulmonary ROS     Neurologic:  - neg neurologic ROS     Cardiovascular:     (+)  hypertension- -   -  - -                                      METS/Exercise Tolerance:     Hematologic:  - neg hematologic  ROS     Musculoskeletal:  - neg musculoskeletal ROS     GI/Hepatic: Comment: Incarcerated hernia with SBO      Renal/Genitourinary:       Endo:     (+)               Obesity,       Psychiatric/Substance Use:  - neg psychiatric ROS     Infectious Disease:       Malignancy:       Other:            Physical Exam    Airway        Mallampati: II   TM distance: > 3 FB   Neck ROM: full   Mouth opening: > 3 cm    Respiratory Devices and Support         Dental       (+) Minor Abnormalities - some fillings, tiny chips      Cardiovascular          Rhythm and rate: regular and normal     Pulmonary           breath sounds clear to auscultation           OUTSIDE LABS:  CBC:   Lab Results   Component Value Date    WBC 11.8 (H) 2024    WBC 6.8 2018    HGB 15.1 2024    HGB 13.3 2018    HCT 44.6 2024    HCT 38.9 2018     2024     2018     BMP:   Lab Results   Component Value Date     2024     2018    POTASSIUM 3.6 2024    POTASSIUM 3.8 2018    CHLORIDE 98 2024    CHLORIDE 101 2018    CO2 21 (L) 2024    CO2 29 2018     "BUN 14.3 04/09/2024    BUN 5 (L) 12/30/2018    CR 0.86 04/09/2024    CR 0.80 12/30/2018     (H) 04/09/2024    GLC 94 12/30/2018     COAGS: No results found for: \"PTT\", \"INR\", \"FIBR\"  POC:   Lab Results   Component Value Date    HCG Negative 12/30/2018    HCGS Negative 04/09/2024     HEPATIC:   Lab Results   Component Value Date    ALBUMIN 4.5 04/09/2024    PROTTOTAL 7.9 04/09/2024    ALT 18 04/09/2024    AST 27 04/09/2024    ALKPHOS 86 04/09/2024    BILITOTAL 0.6 04/09/2024     OTHER:   Lab Results   Component Value Date    LACT 1.6 04/09/2024    ALEJANDRINA 9.7 04/09/2024    LIPASE 33 04/09/2024       Anesthesia Plan    ASA Status:  3, emergent    NPO Status:  NPO Appropriate    Anesthesia Type: General.     - Airway: ETT   Induction: Intravenous.   Maintenance: Balanced.   Techniques and Equipment:       - Drips/Meds: Dexmed. infusion     Consents    Anesthesia Plan(s) and associated risks, benefits, and realistic alternatives discussed. Questions answered and patient/representative(s) expressed understanding.     - Discussed:     - Discussed with:  Patient      - Extended Intubation/Ventilatory Support Discussed: No.      - Patient is DNR/DNI Status: No     Use of blood products discussed: No .     Postoperative Care    Pain management: IV analgesics, Oral pain medications, Multi-modal analgesia.     - Plan for long acting post-op opioid use   PONV prophylaxis: Dexamethasone or Solumedrol, Ondansetron (or other 5HT-3)     Comments:    Other Comments:   Methadone 10 mg  Decadron 8 mg  Zofran 4 mg  Toradol 15 mg  Precedex  Sugammadex if paralytic to be used            Chuckie Ward MD             # Anion Gap Metabolic Acidosis: Highest Anion Gap = 19 mmol/L in last 2 days, will monitor and treat as appropriate          "

## 2024-04-09 NOTE — ED NOTES
"[facility]  ED Nurse Handoff Report    ED Chief complaint: Abdominal Pain  . ED Diagnosis:   Final diagnoses:   Incarcerated hernia   Generalized abdominal pain       Allergies: No Known Allergies    Code Status: Full Code    Activity level - Baseline/Home:  independent.  Activity Level - Current:   independent.   Lift room needed: No.   Bariatric: No   Needed: No   Isolation: No.   Infection: Not Applicable.     Respiratory status: Nasal cannula    Vital Signs (within 30 minutes):   Vitals:    04/09/24 1427 04/09/24 1442 04/09/24 1456 04/09/24 1518   BP: 137/85 133/85 139/89 (!) 142/98   Pulse:  55     Resp:       Temp:       TempSrc:       SpO2: 98% 100% 100% 100%       Cardiac Rhythm:  ,      Pain level:    Patient confused: No.   Patient Falls Risk: nonskid shoes/slippers when out of bed.   Elimination Status: Has voided     Patient Report - Initial Complaint: Abd pain.   Focused Assessment: HPI   Shannon Calderon is a 43 year old female presenting with acute onset abdominal pain.  She has a history of a hernia, which she states the surgeon does not want to fix until she loses more weight.  She states she normally has a bulge in her abdomen that is not reducible.  Earlier today, she \"coughed really hard\" and immediately after had severe abdominal pain.  The pain is worse around the hernia and the right lower quadrant.  She endorses nausea but denies vomiting.  No fever, chills, chest pain, shortness of breath, hematuria, dysuria, constipation, diarrhea.     Abnormal Results:   Labs Ordered and Resulted from Time of ED Arrival to Time of ED Departure   BASIC METABOLIC PANEL - Abnormal       Result Value    Sodium 138      Potassium 3.6      Chloride 98      Carbon Dioxide (CO2) 21 (*)     Anion Gap 19 (*)     Urea Nitrogen 14.3      Creatinine 0.86      GFR Estimate 85      Calcium 9.7      Glucose 116 (*)    ROUTINE UA WITH MICROSCOPIC REFLEX TO CULTURE - Abnormal    Color " Urine Yellow      Appearance Urine Clear      Glucose Urine Negative      Bilirubin Urine Negative      Ketones Urine 80 (*)     Specific Gravity Urine 1.010      Blood Urine Negative      pH Urine 8.5 (*)     Protein Albumin Urine 50 (*)     Urobilinogen Urine Normal      Nitrite Urine Negative      Leukocyte Esterase Urine Negative      Mucus Urine Present (*)     RBC Urine <1      WBC Urine 1      Squamous Epithelials Urine 5 (*)    CBC WITH PLATELETS AND DIFFERENTIAL - Abnormal    WBC Count 11.8 (*)     RBC Count 5.41 (*)     Hemoglobin 15.1      Hematocrit 44.6      MCV 82      MCH 27.9      MCHC 33.9      RDW 13.7      Platelet Count 340      % Neutrophils 63      % Lymphocytes 29      % Monocytes 6      % Eosinophils 1      % Basophils 0      % Immature Granulocytes 1      NRBCs per 100 WBC 0      Absolute Neutrophils 7.4      Absolute Lymphocytes 3.5      Absolute Monocytes 0.7      Absolute Eosinophils 0.2      Absolute Basophils 0.0      Absolute Immature Granulocytes 0.1      Absolute NRBCs 0.0     ISTAT GASES LACTATE VENOUS POCT - Abnormal    Lactic Acid POCT 1.6      Bicarbonate Venous POCT 26      O2 Sat, Venous POCT 42 (*)     pCO2 Venous POCT 26 (*)     pH Venous POCT 7.60 (*)     pO2 Venous POCT 19 (*)    HEPATIC FUNCTION PANEL - Normal    Protein Total 7.9      Albumin 4.5      Bilirubin Total 0.6      Alkaline Phosphatase 86      AST 27      ALT 18      Bilirubin Direct <0.20     LIPASE - Normal    Lipase 33     HCG QUALITATIVE PREGNANCY - Normal    hCG Serum Qualitative Negative     ISTAT GASES LACTATE VENOUS POCT        CT Abdomen Pelvis w Contrast   Final Result   IMPRESSION:    1.  Large midline supraumbilical ventral abdominal hernia containing a   few nondilated but hypoenhancing/edematous small bowel loops, fat and   fluid. The bowel loops may be strangulated within the hernia. Consider   surgical consultation.   2.  Mild tree-in-bud micronodular opacities in the left lower lobe,   the  distal atypical pneumonia.      SAMY KOENIG MD            SYSTEM ID:  [id number]          Treatments provided: Pain Control.   Family Comments: Spouse at bedside.   OBS brochure/video discussed/provided to patient:  N/A  ED Medications:   Medications   sodium chloride 0.9 % bag 100 mL for CT scan flush use (100 mLs As instructed $Given 4/9/24 1334)   oxyCODONE (ROXICODONE) tablet 5 mg (5 mg Oral Not Given 4/9/24 1236)   ondansetron (ZOFRAN) injection 4 mg (4 mg Intravenous $Given 4/9/24 1236)   morphine (PF) injection 4 mg (4 mg Intravenous $Given 4/9/24 1242)   LORazepam (ATIVAN) injection 1 mg (1 mg Intravenous $Given 4/9/24 1254)   HYDROmorphone (PF) (DILAUDID) injection 0.5 mg (0.5 mg Intravenous $Given 4/9/24 1323)   iopamidol (ISOVUE-370) solution 500 mL (100 mLs Intravenous $Given 4/9/24 1334)       Drips infusing:  No  For the majority of the shift this patient was Green.   Interventions performed were N/A.    Sepsis treatment initiated: No    Cares/treatment/interventions/medications to be completed following ED care: Pain control.     ED Nurse Name: Priscilla Alfaro RN  3:42 PM

## 2024-04-10 VITALS
HEART RATE: 80 BPM | OXYGEN SATURATION: 98 % | DIASTOLIC BLOOD PRESSURE: 77 MMHG | TEMPERATURE: 97.8 F | WEIGHT: 245 LBS | SYSTOLIC BLOOD PRESSURE: 129 MMHG | RESPIRATION RATE: 18 BRPM | HEIGHT: 64 IN | BODY MASS INDEX: 41.83 KG/M2

## 2024-04-10 LAB — GLUCOSE BLDC GLUCOMTR-MCNC: 129 MG/DL (ref 70–99)

## 2024-04-10 PROCEDURE — 250N000013 HC RX MED GY IP 250 OP 250 PS 637: Performed by: SURGERY

## 2024-04-10 PROCEDURE — 82962 GLUCOSE BLOOD TEST: CPT

## 2024-04-10 PROCEDURE — 250N000011 HC RX IP 250 OP 636: Performed by: SURGERY

## 2024-04-10 PROCEDURE — 258N000003 HC RX IP 258 OP 636: Performed by: SURGERY

## 2024-04-10 PROCEDURE — G0378 HOSPITAL OBSERVATION PER HR: HCPCS

## 2024-04-10 PROCEDURE — 250N000013 HC RX MED GY IP 250 OP 250 PS 637: Performed by: PHYSICIAN ASSISTANT

## 2024-04-10 PROCEDURE — 96372 THER/PROPH/DIAG INJ SC/IM: CPT | Performed by: SURGERY

## 2024-04-10 RX ORDER — IBUPROFEN 600 MG/1
600 TABLET, FILM COATED ORAL EVERY 8 HOURS
COMMUNITY
Start: 2024-04-10

## 2024-04-10 RX ORDER — OXYCODONE HYDROCHLORIDE 5 MG/1
5 TABLET ORAL EVERY 6 HOURS PRN
Qty: 6 TABLET | Refills: 0 | Status: SHIPPED | OUTPATIENT
Start: 2024-04-10 | End: 2024-04-13

## 2024-04-10 RX ORDER — IBUPROFEN 600 MG/1
600 TABLET, FILM COATED ORAL EVERY 8 HOURS
Status: DISCONTINUED | OUTPATIENT
Start: 2024-04-10 | End: 2024-04-10 | Stop reason: HOSPADM

## 2024-04-10 RX ADMIN — KETOROLAC TROMETHAMINE 15 MG: 15 INJECTION, SOLUTION INTRAMUSCULAR; INTRAVENOUS at 03:32

## 2024-04-10 RX ADMIN — IBUPROFEN 600 MG: 600 TABLET, FILM COATED ORAL at 11:07

## 2024-04-10 RX ADMIN — ALBUTEROL SULFATE 2 PUFF: 90 AEROSOL, METERED RESPIRATORY (INHALATION) at 10:22

## 2024-04-10 RX ADMIN — ENOXAPARIN SODIUM 40 MG: 40 INJECTION SUBCUTANEOUS at 13:55

## 2024-04-10 RX ADMIN — SODIUM CHLORIDE, POTASSIUM CHLORIDE, SODIUM LACTATE AND CALCIUM CHLORIDE: 600; 310; 30; 20 INJECTION, SOLUTION INTRAVENOUS at 06:05

## 2024-04-10 RX ADMIN — HYDROCHLOROTHIAZIDE 25 MG: 25 TABLET ORAL at 08:07

## 2024-04-10 ASSESSMENT — ACTIVITIES OF DAILY LIVING (ADL)
ADLS_ACUITY_SCORE: 25

## 2024-04-10 NOTE — PLAN OF CARE
OUTPATIENT/OBSERVATION GOALS TO BE MET BEFORE DISCHARGE:  ADLs back to baseline: Yes    Activity and level of assistance: Ambulating independently.    Pain status: Improved-controlled with oral pain medications.    Return to near baseline physical activity: Yes     Discharge Planner Nurse   Safe discharge environment identified: Yes  Barriers to discharge: No       Entered by: Carmen Romero RN 04/10/2024 1200       Pt A&O x4. Tolerating full liquid diet well. Minimal pain managed with oral medications. Pt Ind in room with amb. VSS with soft BP's.     Please review provider order for any additional goals.   Nurse to notify provider when observation goals have been met and patient is ready for discharge.

## 2024-04-10 NOTE — PROGRESS NOTES
"[facility]   General Surgery Progress Note           Assessment and Plan:   Assessment:   -Incarcerated epigastric hernia  -1 Day Post-Op Reduction and primary repair of incarcerated epigastric hernia  -Postoperative hypoxemia, supplemental oxygen; resolved  -Afebrile      Plan:   -IV fluids: Lactated Ringer @100mL/hr  -Pain management: toradol, transition to oral  -Prophylaxis: Enoxaparin (Lovenox) SQ and Pneumatic Compression Devices  -Diet: increase to full liquids, ok to slowly ADAT over the following days, ok to do at home.  Reviewed in detail.  -Advance activity as tolerated, encouraged OOB and ambulate 3-4 times a day  -IS hourly as able, encouraged  -Dispo: OK to DC today. DC Rx: oxycodone; OTC acetaminophen/motrin.  OTC bowel program prn.  Surgery office PA will call in 2-3 weeks for postop check.  Discharge instructions were reviewed with the patient in detail.  All her questions/concerns were addressed.  She is aware that a printed copy of instructions will be given to her upon discharge.         Interval History:   Feeling much better.  No nausea, tolerating clears, passing flatus, +BMs.  Reviewed surgery findings, postop restrictions/instructions and returning to diet.  Questions answered.  Patient wants to discharge.  Will try all oral meds first, then ok to discharge home.         Physical Exam:   Blood pressure 95/61, pulse 64, temperature 97.6  F (36.4  C), temperature source Oral, resp. rate 18, height 1.626 m (5' 4\"), weight 111.1 kg (245 lb), last menstrual period 04/01/2024, SpO2 97%.  I/O last 3 completed shifts:  In: 1550 [I.V.:1550]  Out: -   Abdomen:   soft, non-distended, tenderness noted about the incision and hypoactive bowel sounds   Incisions - bandage clean, dry, intact              Data:     Recent Labs   Lab 04/09/24  1232   WBC 11.8*   HGB 15.1   HCT 44.6   MCV 82        Recent Labs   Lab 04/10/24  0831 04/09/24  2147 04/09/24  1232   NA  --   --  138 "   POTASSIUM  --   --  3.6   CHLORIDE  --   --  98   CO2  --   --  21*   ANIONGAP  --   --  19*   *  --  116*   BUN  --   --  14.3   CR  --  0.74 0.86   GFRESTIMATED  --  >90 85   ALEJANDRINA  --   --  9.7   PROTTOTAL  --   --  7.9   ALBUMIN  --   --  4.5   BILITOTAL  --   --  0.6   ALKPHOS  --   --  86   AST  --   --  27   ALT  --   --  18       NNEKA Maldonado or Text page: [phone], 8 am to 4 pm  After 4 pm, call [phone]

## 2024-04-10 NOTE — ANESTHESIA PROCEDURE NOTES
Airway       Patient location during procedure: OR       Procedure Start/Stop Times: 4/9/2024 6:56 PM  Staff -        CRNA: Maranda Ratliff APRN CRNA       Performed By: CRNA  Consent for Airway        Urgency: elective  Indications and Patient Condition       Indications for airway management: shyam-procedural       Induction type:intravenous       Mask difficulty assessment: 2 - vent by mask + OA or adjuvant +/- NMBA    Final Airway Details       Final airway type: endotracheal airway       Successful airway: ETT - single  Endotracheal Airway Details        ETT size (mm): 7.0       Cuffed: yes       Successful intubation technique: video laryngoscopy       VL Blade Size: Glidescope 3       Grade View of Cords: 1       Adjucts: stylet       Position: Right       Measured from: lips       Secured at (cm): 22    Post intubation assessment        Placement verified by: capnometry, equal breath sounds and chest rise        Number of attempts at approach: 1       Number of other approaches attempted: 0       Secured with: tape       Ease of procedure: easy       Dentition: Intact and Unchanged    Medication(s) Administered   Medication Administration Time: 4/9/2024 6:56 PM

## 2024-04-10 NOTE — OP NOTE
General Surgery Operative Note      Pre-operative diagnosis: Incarcerated epigastric hernia   Post-operative diagnosis: Same    Procedure: Reduction and repair of incarcerated epigastric hernia.    Surgeon: Hector Aragon MD   Assistant(s): Macy Samaniego PA-C  The Physician Assistant was medically necessary for their expertise in prepping, suctioning, suturing and retraction.   Anesthesia: General    Estimated blood loss:   5 cc       INDICATION:  Large epigastric hernia, clinically irreducible and containing small bowel on CT.  DESCRIPTION OF PROCEDURE: The patient was placed on the table in supine position. The abdomen was prepped and draped in standard sterile fashion. A longitudinal 5cm incision in the midline was made with a blade over the site of the palpable hernia contents. The incision was carried down into the subcutaneous tissue. The hernia sac was circumferentially  from surrounding subcutaneous tissue using blunt and electrocautery dissection. The volume of the sac was just larger than a baseball. It was delivered above the level of the skin for exposure of the hernia neck. The fascia was exposed circumferentially and the fascia at the superior edge was undermined with a right angle and the fascia was split 1cm further to allow for exploration of the contents. The sac was then opened and a moderate amount of simple non-bloody ascites was suctioned. There was edematous and indurated omentum in the sac but no bowel. There was a small segment of normal appearing transverse colon just deep to the defect. We amputated the sac off the fascial edge and likewise amputated the omental contents using a LigaSure. The fascial defect was 3 cm. We closed the fascia with 0 PDS sutures x2. The site was then irrigated and injected with 30mL of 0.5% plain Marcaine. We closed the deeper layers with 3-0 Vicryl. The skin was closed with a running 4-0 Vicryl subcuticular suture and Steri Strips. The  patient tolerated the procedure well. Sponge and instrument counts were correct.   Hector Monreal MD

## 2024-04-10 NOTE — ANESTHESIA POSTPROCEDURE EVALUATION
Patient: Shannon Calderon    Procedure: Procedure(s):  Reduction and repair of epigastric hernia       Anesthesia Type:  General    Note:  Disposition: Outpatient   Postop Pain Control: Uneventful            Sign Out: Well controlled pain   PONV: No   Neuro/Psych: Uneventful            Sign Out: Acceptable/Baseline neuro status   Airway/Respiratory: Uneventful            Sign Out: Acceptable/Baseline resp. status   CV/Hemodynamics: Uneventful            Sign Out: Acceptable CV status; No obvious hypovolemia; No obvious fluid overload   Other NRE: NONE   DID A NON-ROUTINE EVENT OCCUR? No           Last vitals:  Vitals Value Taken Time   /69 04/09/24 2045   Temp 97.9  F (36.6  C) 04/09/24 2045   Pulse 75 04/09/24 2053   Resp 15 04/09/24 2053   SpO2 96 % 04/09/24 2053   Vitals shown include unfiled device data.    Electronically Signed By: Jaden Campos MD  April 9, 2024  8:57 PM

## 2024-04-10 NOTE — CARE PLAN
Patient's After Visit Summary was reviewed with patient .  Patient verbalized understanding of After Visit Summary, recommended follow up and was given an opportunity to ask questions.   Discharge medications sent home with patient/family: YES   Discharged with spouse

## 2024-04-10 NOTE — PLAN OF CARE
ROOM # 233    Living Situation (if not independent, order SW consult): Lives with   Facility name:  : [facility] () [phone]    Activity level at baseline: Independent  Activity level on admit: SBA    Who will be transporting you at discharge: Family    Patient registered to observation; given Patient Bill of Rights; given the opportunity to ask questions about observation status and their plan of care.  Patient has been oriented to the observation room, bathroom and call light is in place.    Discussed discharge goals and expectations with patient/family.

## 2024-04-10 NOTE — PLAN OF CARE
"PRIMARY DIAGNOSIS: SP Reduction and repair of incarcerated Epigastric Hernia  OUTPATIENT/OBSERVATION GOALS TO BE MET BEFORE DISCHARGE:  1. Stable vital signs Yes  2. Tolerating diet: Had ice chips only  3. Pain controlled with oral pain medications:   on scheduled IV Toradol  4. Positive bowel sounds:   Hypoactive/ had not pass gas yet  5. Voiding without difficulty:  Yes  6. Able to ambulate:  Yes  7. Provider specific discharge goals met:  Yes    Discharge Planner Nurse   Safe discharge environment identified: No  Barriers to discharge: Yes       Entered by: Arline Suarez RN 04/09/2024 11:44 PM   Admitted from PACU. Alert and oriented upon arrival to the Unit complained of nausea, PRN IV Zofran given with minimal effect then IV Compazine given with good effect per patient. Rating pain 3/10. ON continuous Capno monitoring, reduced O2 from 2t o 1LPM/NC maintaining Sats above 92%..    Please review provider order for any additional goals.   Nurse to notify provider when observation goals have been met and patient is ready for discharge.  Problem: Adult Inpatient Plan of Care  Goal: Plan of Care Review  Description: The Plan of Care Review/Shift note should be completed every shift.  The Outcome Evaluation is a brief statement about your assessment that the patient is improving, declining, or no change.  This information will be displayed automatically on your shift  note.  Outcome: Progressing  Flowsheets (Taken 4/9/2024 7645)  Outcome Evaluation: pain better  Plan of Care Reviewed With: patient  Overall Patient Progress: improving  Goal: Patient-Specific Goal (Individualized)  Description: You can add care plan individualizations to a care plan. Examples of Individualization might be:  \"Parent requests to be called daily at 9am for status\", \"I have a hard time hearing out of my right ear\", or \"Do not touch me to wake me up as it startles  me\".  Outcome: Progressing  Goal: Absence of Hospital-Acquired Illness or " Injury  Outcome: Progressing  Intervention: Identify and Manage Fall Risk  Recent Flowsheet Documentation  Taken 4/9/2024 2158 by Arline Suarez RN  Safety Promotion/Fall Prevention: activity supervised  Intervention: Prevent Skin Injury  Recent Flowsheet Documentation  Taken 4/9/2024 2257 by Arline Suarez RN  Body Position: position changed independently  Goal: Optimal Comfort and Wellbeing  Outcome: Progressing  Intervention: Monitor Pain and Promote Comfort  Recent Flowsheet Documentation  Taken 4/9/2024 2203 by Arline Suarez RN  Pain Management Interventions: medication (see MAR)  Goal: Readiness for Transition of Care  Outcome: Progressing  Intervention: Mutually Develop Transition Plan  Recent Flowsheet Documentation  Taken 4/9/2024 2159 by Arline Suarez RN  Equipment Currently Used at Home: none     Problem: Hernia Repair  Goal: Absence of Bleeding  Outcome: Progressing  Goal: Effective Bowel Elimination  Outcome: Progressing  Goal: Fluid and Electrolyte Balance  Outcome: Progressing  Goal: Absence of Infection Signs and Symptoms  Outcome: Progressing  Goal: Anesthesia/Sedation Recovery  Outcome: Progressing  Intervention: Optimize Anesthesia Recovery  Recent Flowsheet Documentation  Taken 4/9/2024 2158 by Arline Suarez RN  Safety Promotion/Fall Prevention: activity supervised  Goal: Optimal Pain Control and Function  Outcome: Progressing  Intervention: Prevent or Manage Pain  Recent Flowsheet Documentation  Taken 4/9/2024 2203 by Arline Suarez RN  Pain Management Interventions: medication (see MAR)  Goal: Nausea and Vomiting Relief  Outcome: Progressing  Intervention: Prevent or Manage Nausea and Vomiting  Recent Flowsheet Documentation  Taken 4/9/2024 2158 by Arline Suarez RN  Nausea/Vomiting Interventions: (ice chips given)   nausea triggers minimized   antiemetic   other (see comments)  Goal: Effective Urinary Elimination  Outcome: Progressing  Goal: Effective Oxygenation  and Ventilation  Outcome: Progressing  Intervention: Optimize Oxygenation and Ventilation  Recent Flowsheet Documentation  Taken 4/9/2024 2257 by Arline Suarez, RN  Head of Bed (HOB) Positioning: HOB at 60 degrees     Goal Outcome Evaluation:      Plan of Care Reviewed With: patient    Overall Patient Progress: improvingOverall Patient Progress: improving    Outcome Evaluation: pain better

## 2024-04-10 NOTE — PROGRESS NOTES
OUTPATIENT/OBSERVATION GOALS TO BE MET BEFORE DISCHARGE:  ADLs back to baseline: No    Activity and level of assistance: Up with standby assistance.    Pain status: Pain free.    Return to near baseline physical activity: No     Discharge Planner Nurse   Safe discharge environment identified: No  Barriers to discharge: Yes       Entered by: Carmen Romero RN 04/10/2024 800   Pt A&O x4. VSS. No c/o pain. Pt reports loose stool over night.   Please review provider order for any additional goals.   Nurse to notify provider when observation goals have been met and patient is ready for discharge.

## 2024-04-10 NOTE — ANESTHESIA CARE TRANSFER NOTE
Patient: Shannon Calderon    Procedure: Procedure(s):  Reduction and repair of epigastric hernia       Diagnosis: Strangulated umbilical hernia [K42.0]  Diagnosis Additional Information: No value filed.    Anesthesia Type:   General     Note:    Oropharynx: oropharynx clear of all foreign objects and spontaneously breathing  Level of Consciousness: awake  Oxygen Supplementation: face mask  Level of Supplemental Oxygen (L/min / FiO2): 8l  Independent Airway: airway patency satisfactory and stable  Dentition: dentition unchanged  Vital Signs Stable: post-procedure vital signs reviewed and stable  Report to RN Given: handoff report given  Patient transferred to: PACU  Comments: Pt to PACU, VSS, report to RN  Handoff Report: Identifed the Patient, Identified the Reponsible Provider, Reviewed the pertinent medical history, Discussed the surgical course, Reviewed Intra-OP anesthesia mangement and issues during anesthesia, Set expectations for post-procedure period and Allowed opportunity for questions and acknowledgement of understanding      Vitals:  Vitals Value Taken Time   /70 04/09/24 1950   Temp     Pulse 96 04/09/24 1954   Resp 27 04/09/24 1954   SpO2 100 % 04/09/24 1954   Vitals shown include unfiled device data.    Electronically Signed By: JANEL Kimble CRNA  April 9, 2024  7:55 PM

## 2024-04-10 NOTE — DISCHARGE INSTRUCTIONS
"HOME CARE FOLLOWING UMBILICAL/VENTRAL HERNIA REPAIR  JORGE LUIS Mann, RAPHAEL Galeas, RAPHAEL Mobley, NATASHA Ibarra, CARISSA Bran    Special instructions for Shannon Calderon:  --Consider appointment with Dr Monreal in 2-3 months to discuss definitive repair of site.     DIET:  Start with liquids and gradually resume your regular diet as tolerated.  Increased fluid intake is recommended. While taking pain medications, consider use of a stool softener, increase your fiber in your diet, or add a fiber supplement (like Metamucil, Citrucel) to help prevent constipation - a possible side effect of pain medications.    NAUSEA:  If nauseated from the anesthetic/pain meds; rest in bed, get up cautiously with assistance, and drink clear liquids (juice, tea, broth).    ACTIVITY:  Light Activity -- you may immediately be up and about as tolerated.  Walking is encouraged, increase as tolerated.  Driving/Light Work-- when comfortable and off narcotic pain medications.  Strenuous Work/Activity -- limit lifting to 20 pounds for 3 weeks.  Active Sports (running, biking, etc.) -- cautiously resume after 4 weeks.    INCISIONAL CARE:  If you have a dressing in place, keep clean and dry for 48 hours after surgery.  After this timeframe, you may replace the gauze daily if it becomes soiled.  You may remove the dressing and shower 48 hours after surgery.  Do not submerse incision in water for 1 week.  If you have a Dermabond dressing (a type of skin glue), you may shower immediately.  Sutures will absorb and need not be removed.  If present, leave the steri-strips (white paper tapes) in place for 14 days after surgery.  If present, leave Dermabond glue in place until it wears/flakes off.  Do not apply lotions, creams, or ointments to incisions.  Expect a variable amount of swelling/bruising/discoloration that may appear around or below the repair site.  Some numbness around the incision is common.  A lump/\"healing ridge\" under the " incision is normal and will gradually resolve over the following 1-2 months.    DISCOMFORT:  Local anesthetic placed at surgery should provide relief for 4-8 hours.  Begin taking pain pills before discomfort is severe.  Take the pain medication with some food, when possible, to minimize side effects.  Intermittent use of ice packs to the hernia repair site may help during the first 1-3 weeks after surgery.  Expect gradual improvement.    Over-the-counter anti-inflammatory medications (i.e. Ibuprofen/Advil/Motrin or Naprosyn/Aleve) may be used per package instructions in addition to or while tapering off the narcotic pain medications to decrease swelling and sensitivity at the repair site.  DO NOT TAKE these Anti-inflammatory medications if your primary physician has advised against doing so, or if you have acid reflux, ulcer, or bleeding disorder, or take blood-thinner medications.  Call your primary physician or the surgery office if you have medication questions.      FOLLOW-UP AFTER SURGERY:  -Our office will contact you approximately 2-3 weeks after surgery to check on your progress and answer any questions you may have.  If you are doing well, you will not need to return for an office appointment.  If any concerns are identified over the phone, we will help you make an appointment to see a provider.    -If you have not received a phone call, have any questions or concerns, or would like to be seen, please call us at [phone].  We are located at: [address], [address]; [place] [address]    -CONTACT US IF THE FOLLOWING DEVELOPS:   1. A fever that is above 101     2. Increased redness, warmth, drainage, bleeding, or swelling.   3. Pain that is not relieved by rest/ice and your prescription.   4.  Increasing pain after 48 hours.   5. Drainage that is thick, cloudy, yellow, green or white.   6. Any other questions or concerns.      FREQUENTLY ASKED QUESTIONS:    Q:  How should my incision look?    A:   Normally your incision will appear slightly swollen with light redness directly along the incision itself as it heals.  It may feel like a bump or ridge as the healing/scarring happens, and over time (3-4 months) this bump or ridge feeling should slowly go away.  In general, clear or pink watery drainage can be normal at first as your incision heals, but should decrease over time.    Q:  How do I know if my incision is infected?  A:  Look at your incision for signs of infection, like redness around the incision spreading to surrounding skin, or drainage of cloudy or foul-smelling drainage.  If you feel warm, check your temperature to see if you are running a fever.    **If any of these things occur, please notify the nurse at our office.  We may need you to come into the office for an incision check.      Q:  How do I take care of my incision?  A:  If you have a dressing in place - Starting the day after surgery, replace the dressing 1-2 times a day until there is no further drainage from the incision.  At that time, a dressing is no longer needed.  Try to minimize tape on the skin if irritation is occurring at the tape sites.  If you have significant irritation from tape on the skin, please call the office to discuss other method of dressing your incision.    Small pieces of tape called  steri-strips  may be present directly overlying your incision; these may be removed 10 days after surgery unless otherwise specified by your surgeon.  If these tapes start to loosen at the ends, you may trim them back until they fall off or are removed.    A:  If you had  Dermabond  tissue glue used as a dressing (this causes your incision to look shiny with a clear covering over it) - This type of dressing wears off with time and does not require more dressings over the top unless it is draining around the glue as it wears off.  Do not apply ointments or lotions over the incisions until the glue has completely worn off.    Q:   There is a piece of tape or a sticky  lead  still on my skin.  Can I remove this?  A:  Sometimes the sticky  leads  used for monitoring during surgery or for evaluation in the emergency department are not all removed while you are in the hospital.  These sometimes have a tab or metal dot on them.  You can easily remove these on your own, like taking off a band-aid.  If there is a gel substance under the  lead , simply wipe/clean it off with a washcloth or paper towel.      Q:  What can I do to minimize constipation (very hard stools, or lack of stools)?  A:  Stay well hydrated.  Increase your dietary fiber intake or take a fiber supplement -with plenty of water.  Walk around frequently.  You may consider an over-the-counter stool-softener.  Your Pharmacist can assist you with choosing one that is stocked at your pharmacy.  Constipation is also one of the most common side effects of pain medication.  If you are using pain medication, be pro-active and try to PREVENT problems with constipation by taking the steps above BEFORE constipation becomes a problem.    Q:  What do I do if I need more pain medications?  A:  Call the office to receive refills.  Be aware that certain pain meds cannot be called into a pharmacy and actually require a paper prescription.  A change may be made in your pain med as you progress thru your recovery period or if you have side effects to certain meds.    --Pain meds are NOT refilled after 5pm on weekdays, and NOT AT ALL on the weekends, so please look ahead to prevent problems.    Q:  Why am I having a hard time sleeping now that I am at home?  A:  Many medications you receive while you are in the hospital can impact your sleep for a number of days after your surgery/hospitalization.  Decreased level of activity and naps during the day may also make sleeping at night difficult.  Try to minimize day-time naps, and get up frequently during the day to walk around your home during your  recovery time.  Sleep aides may be of some help, but are not recommended for long-term use.      Q:  I am having some back discomfort.  What should I do?  A:  This may be related to certain positioning that was required for your surgery, extended periods of time in bed, or other changes in your overall activity level.  You may try ice, heat, acetaminophen, or ibuprofen to treat this temporarily.  Note that many pain medications have acetaminophen in them and would state this on the prescription bottle.  Be sure not to exceed the maximum of 4000mg per day of acetaminophen.     **If the pain you are having does not resolve, is severe, or is a flare of back pain you have had on other occasions prior to surgery, please contact your primary physician for further recommendations or for an appointment to be examined at their office.    Q:  Why am I having headaches?  A:  Headaches can be caused by many things:  caffeine withdrawal, use of pain meds, dehydration, high blood pressure, lack of sleep, over-activity/exhaustion, flare-up of usual migraine headaches.  If you feel this is related to muscle tension (a band-like feeling around the head, or a pressure at the low-back of the head) you may try ice or heat to this area.  You may need to drink more fluids (try electrolyte drink like Gatorade), rest, or take your usual migraine medications.   **If your headaches do not resolve, worsen, are accompanied by other symptoms, or if your blood pressure is high, please call your primary physician for recommendation and/or examination.    Q:  I am unable to urinate.  What do I do?  A:  A small percentage of people can have difficulty urinating initially after surgery.  This includes being able to urinate only a very small amount at a time and feeling discomfort or pressure in the very low abdomen.  This is called  urinary retention , and is actually an urgent situation.  Proceed to your nearest Emergency department for evaluation  (not an Urgent Care Center).  Sometimes the bladder does not work correctly after certain medications you receive during surgery, or related to certain procedures.  You may need to have a catheter placed until your bladder recovers.  When planning to go to an Emergency department, it may help to call the ER to let them know you are coming in for this problem after a surgery.  This may help you get in quicker to be evaluated.  **If you have symptoms of a urinary tract infection, please contact your primary physician for the proper evaluation and treatment.        If you have other questions, please call the office Monday thru Friday between 8am and 4:30pm to discuss with the nurse or physician assistant.  #[phone]    There is a surgeon ON CALL on weekday evenings and over the weekend in case of urgent need only, and may be contacted at the same number.    If you are having an emergency, call 911 or proceed to your nearest emergency department.

## 2024-04-10 NOTE — ANESTHESIA POSTPROCEDURE EVALUATION
Patient: Shannon Calderon    Procedure: Procedure(s):  Reduction and repair of epigastric hernia       Anesthesia Type:  General    Note:  Disposition: Outpatient   Postop Pain Control: Uneventful            Sign Out: Well controlled pain   PONV: No   Neuro/Psych: Uneventful            Sign Out: Acceptable/Baseline neuro status   Airway/Respiratory: Uneventful            Sign Out: Acceptable/Baseline resp. status   CV/Hemodynamics: Uneventful            Sign Out: Acceptable CV status; No obvious hypovolemia; No obvious fluid overload   Other NRE: NONE   DID A NON-ROUTINE EVENT OCCUR? No           Last vitals:  Vitals Value Taken Time   /70 04/09/24 1950   Temp 98.8  F (37.1  C) 04/09/24 1950   Pulse 97 04/09/24 1957   Resp 43 04/09/24 1957   SpO2 100 % 04/09/24 1957   Vitals shown include unfiled device data.    Electronically Signed By: Jaden Campos MD  April 9, 2024  7:58 PM

## 2024-04-10 NOTE — PLAN OF CARE
"PRIMARY DIAGNOSIS: SP Hernia Repair  OUTPATIENT/OBSERVATION GOALS TO BE MET BEFORE DISCHARGE:  1. Stable vital signs Yes  2. Tolerating diet:Yes  3. Pain controlled with oral pain medications:   On scheduled Toradol  4. Positive bowel sounds:  Yes had bowels opened  5. Voiding without difficulty:  Yes  6. Able to ambulate:  Yes  7. Provider specific discharge goals met:  Yes    Discharge Planner Nurse   Safe discharge environment identified: No  Barriers to discharge: Yes       Entered by: Arline Suarez RN 04/10/2024 3:52 AM    Pain controlled with scheduled Toradol. On continuous Capno monitoring, oxygen down to 1/2 LPM/NC from 2 maintaining Sats 94%. LR ongoing at 100 ml/hr. Abdominal binder on. Dressing CDI.     Please review provider order for any additional goals.   Nurse to notify provider when observation goals have been met and patient is ready for discharge.  Problem: Adult Inpatient Plan of Care  Goal: Plan of Care Review  Description: The Plan of Care Review/Shift note should be completed every shift.  The Outcome Evaluation is a brief statement about your assessment that the patient is improving, declining, or no change.  This information will be displayed automatically on your shift  note.  4/10/2024 0350 by Arline Suarez RN  Outcome: Progressing  Flowsheets (Taken 4/10/2024 0350)  Outcome Evaluation: pain controlled  Plan of Care Reviewed With: patient  Overall Patient Progress: improving  4/9/2024 2343 by Arline Suarez RN  Outcome: Progressing  Flowsheets (Taken 4/9/2024 2343)  Outcome Evaluation: pain better  Plan of Care Reviewed With: patient  Overall Patient Progress: improving  Goal: Patient-Specific Goal (Individualized)  Description: You can add care plan individualizations to a care plan. Examples of Individualization might be:  \"Parent requests to be called daily at 9am for status\", \"I have a hard time hearing out of my right ear\", or \"Do not touch me to wake me up as it " "startles  me\".  4/10/2024 0350 by Arline Suarez RN  Outcome: Progressing  4/9/2024 2343 by Arline Suarez RN  Outcome: Progressing  Goal: Absence of Hospital-Acquired Illness or Injury  4/10/2024 0350 by Arline Suarez RN  Outcome: Progressing  4/9/2024 2343 by Arline Suarez RN  Outcome: Progressing  Intervention: Identify and Manage Fall Risk  Recent Flowsheet Documentation  Taken 4/9/2024 2158 by Arline Suarez RN  Safety Promotion/Fall Prevention: activity supervised  Intervention: Prevent Skin Injury  Recent Flowsheet Documentation  Taken 4/9/2024 2257 by Arline Suarez RN  Body Position: position changed independently  Goal: Optimal Comfort and Wellbeing  4/10/2024 0350 by Arline Suarez RN  Outcome: Progressing  4/9/2024 2343 by Arline Suarez RN  Outcome: Progressing  Intervention: Monitor Pain and Promote Comfort  Recent Flowsheet Documentation  Taken 4/9/2024 2203 by Arline Suarez RN  Pain Management Interventions: medication (see MAR)  Goal: Readiness for Transition of Care  4/10/2024 0350 by Arline Suarez RN  Outcome: Progressing  4/9/2024 2343 by Arlnie Suarez RN  Outcome: Progressing  Intervention: Mutually Develop Transition Plan  Recent Flowsheet Documentation  Taken 4/9/2024 2159 by Arline Suarez RN  Equipment Currently Used at Home: none     Problem: Hernia Repair  Goal: Absence of Bleeding  4/10/2024 0350 by Arline Suarez RN  Outcome: Progressing  4/9/2024 2343 by Arline Suarez RN  Outcome: Progressing  Goal: Effective Bowel Elimination  4/10/2024 0350 by Arline Suarez RN  Outcome: Progressing  4/9/2024 2343 by Arline Suarez RN  Outcome: Progressing  Goal: Fluid and Electrolyte Balance  4/10/2024 0350 by Arline Suarez RN  Outcome: Progressing  4/9/2024 2343 by Siocson, Arline F, RN  Outcome: Progressing  Goal: Absence of Infection Signs and Symptoms  4/10/2024 0350 by Arline Suarez, RN  Outcome: Progressing  4/9/2024 " 2343 by Arline Suarez RN  Outcome: Progressing  Goal: Anesthesia/Sedation Recovery  4/10/2024 0350 by Arline Suarez RN  Outcome: Progressing  4/9/2024 2343 by Arline Suarez RN  Outcome: Progressing  Intervention: Optimize Anesthesia Recovery  Recent Flowsheet Documentation  Taken 4/9/2024 2158 by Arline Suarez RN  Safety Promotion/Fall Prevention: activity supervised  Goal: Optimal Pain Control and Function  4/10/2024 0350 by Arline Suarez RN  Outcome: Progressing  4/9/2024 2343 by Arline Suarez RN  Outcome: Progressing  Intervention: Prevent or Manage Pain  Recent Flowsheet Documentation  Taken 4/9/2024 2203 by Arline Suarez RN  Pain Management Interventions: medication (see MAR)  Goal: Nausea and Vomiting Relief  4/10/2024 0350 by Arline Suarez RN  Outcome: Progressing  4/9/2024 2343 by Arline Suarez RN  Outcome: Progressing  Intervention: Prevent or Manage Nausea and Vomiting  Recent Flowsheet Documentation  Taken 4/9/2024 2158 by Arline Suarez RN  Nausea/Vomiting Interventions: (ice chips given)   nausea triggers minimized   antiemetic   other (see comments)  Goal: Effective Urinary Elimination  4/10/2024 0350 by Arline Suarez RN  Outcome: Progressing  4/9/2024 2343 by Arline Suarez RN  Outcome: Progressing  Goal: Effective Oxygenation and Ventilation  4/10/2024 0350 by Arline Suarez RN  Outcome: Progressing  4/9/2024 2343 by Arline Suarez RN  Outcome: Progressing  Intervention: Optimize Oxygenation and Ventilation  Recent Flowsheet Documentation  Taken 4/9/2024 2257 by Arline Suarez RN  Head of Bed (HOB) Positioning: HOB at 60 degrees    Goal Outcome Evaluation:      Plan of Care Reviewed With: patient    Overall Patient Progress: improvingOverall Patient Progress: improving    Outcome Evaluation: pain controlled

## 2024-04-10 NOTE — PHARMACY-ADMISSION MEDICATION HISTORY
Admission Medication History  Medication history and patient interview completed by pre-op/PACU RN. Reviewed by pharmacist, including [facility] dispense records, [facility] Everywhere, and chart review.       Suresh Bethea, Pharm.D., BCPS      Last Reviewed by Joie Pritchett, JANEL CNP on 4/9/2024 at 4:18 PM      PTA Med List   Medication Sig Last Dose    albuterol (PROAIR HFA/PROVENTIL HFA/VENTOLIN HFA) 108 (90 Base) MCG/ACT inhaler Inhale 2 puffs into the lungs every 6 hours 4/9/2024 at 1030    hydrochlorothiazide (HYDRODIURIL) 25 MG tablet Take 1 tablet by mouth daily 4/8/2024

## 2024-04-11 LAB
PATH REPORT.COMMENTS IMP SPEC: NORMAL
PATH REPORT.COMMENTS IMP SPEC: NORMAL
PATH REPORT.FINAL DX SPEC: NORMAL
PATH REPORT.GROSS SPEC: NORMAL
PATH REPORT.MICROSCOPIC SPEC OTHER STN: NORMAL
PATH REPORT.RELEVANT HX SPEC: NORMAL
PHOTO IMAGE: NORMAL

## 2024-04-11 PROCEDURE — 88302 TISSUE EXAM BY PATHOLOGIST: CPT | Mod: 26 | Performed by: PATHOLOGY

## 2024-04-22 NOTE — DISCHARGE SUMMARY
Surgery Discharge Summary    Shannon Calderon MRN# [id number]   YOB: 1980 Age: 43 year old     Date of Admission:  4/9/2024  Date of Discharge:  4/10/2024  3:52 PM  Admitting Physician:  Hector Aragon MD  Discharging Service:  General Surgery   Primary Provider: Bri Merchant    Discharge Diagnosis:   Principle Diagnosis:  Strangulated umbilical hernia [K42.0]  Generalized abdominal pain [R10.84]  Incarcerated hernia [K46.0]      Brief HPI: Shannon Calderon is a 43 year old year-old female who presents with acute periumbilical abdominal pain which began today after she had a forceful cough. She has had a recognized supraumbilical hernia for years and has seen a surgeon regarding repair in the past. She has been advised to lose weight and has gotten shelter to her goal. She has had some nausea and increased firmness at the hernia site. She has not had any other abdominal operations. The patient was offered surgery and she was consented and agreed to proceed.      Hospital Course: Shannon Calderon underwent reduction and repair of incarcerated epigastric hernia  without complications. Please see op note for further details. The patient had a routine hospital course and recovered as anticipated. By POD #1, she had advanced her diet and was transitioned successfully to oral pain medications. She was  ambulating independently and voiding spontaneously. By day of discharge, she remained afebrile, was tolerating an oral diet, had adequate pain control on oral medications and was ambulating independently thus medically appropriate for discharge to home. She will follow-up with us in two weeks was advised to call sooner with any questions or concerns.     Inpatient Consultations: No consultations were requested during this admission    Procedures:   Procedure(s):  Reduction and repair of epigastric hernia     Disposition:   Discharged to home     Discharge Condition  Discharge  "condition: Stable   Discharge vitals: Blood pressure 129/77, pulse 80, temperature 97.8  F (36.6  C), temperature source Oral, resp. rate 18, height 1.626 m (5' 4\"), weight 111.1 kg (245 lb), last menstrual period 04/01/2024, SpO2 98%.     Discharge Medications:   Discharge Medication List as of 4/10/2024  3:06 PM        START taking these medications    Details   ibuprofen (ADVIL/MOTRIN) 600 MG tablet Take 1 tablet (600 mg) by mouth every 8 hours, OTC      oxyCODONE (ROXICODONE) 5 MG tablet Take 1 tablet (5 mg) by mouth every 6 hours as needed for pain, Disp-6 tablet, R-0, E-Prescribe           CONTINUE these medications which have NOT CHANGED    Details   albuterol (PROAIR HFA/PROVENTIL HFA/VENTOLIN HFA) 108 (90 Base) MCG/ACT inhaler Inhale 2 puffs into the lungs every 6 hours, Historical      hydrochlorothiazide (HYDRODIURIL) 25 MG tablet Take 1 tablet by mouth daily, Historical             Discharge Instructions:   Follow-up Appointments     Follow-up and recommended labs and tests       Follow up call with surgery office PA in 2-3 weeks.  OK to call surgery   office if questions/concerns in the meantime.          After Care Instructions       Activity      Your activity upon discharge: no lifting more than 20 lbs for 3 weeks, no strenuous exercise for 6 weeks.        Diet      Follow this diet upon discharge: Full Liquid Diet, slowly increase over the following few days.        Wound care and dressings      Instructions to care for your wound at home: daily dressing changes, ice to area for comfort, and remove steri-strips in 14 days.              HOME CARE FOLLOWING ABDOMINAL SURGERY  JORGE LUIS Mann, RAPHAEL Hinkle C. Pratt, J. Shaheen    INCISIONAL CARE:  Replace the bandage over your incision (or incisions) until all drainage stops, or if more comfortable to have in place.  If present, leave the steri-strips (white paper tapes) in place for 14 days after surgery.  If you have staples " in your incision at the time of discharge, they will be removed at your follow-up appointment.  If Dermabond (a type of skin glue) is present, leave in place until it wears/flakes off.     BATHING:  Avoid baths for 1 week after surgery.  Showers are okay.  You may wash your hair at any time.  Gently pat your incision dry after bathing.    ACTIVITY:  Light Activity -- you may immediately be up and about as tolerated.  Driving -- you may drive when comfortable and off narcotic pain medications.  Light Work -- resume when comfortable off pain medications.  (If you can drive, you probably can work.)  Strenuous Work/Activity -- limit lifting to 20 pounds for 4 weeks.  Then, progressively increase with time.  Active Sports (running, biking, etc.) -- cautiously resume after 6 weeks.    DISCOMFORT:  Use pain medications as prescribed by your surgeon.  Take the pain medication with some food, when possible, to minimize side effects.  Expect gradual improvement.    DIET:  Return to diet you were on before surgery, unless you are given specific diet instructions.  Drink plenty of fluids.  While taking pain medications, increase dietary fiber or add a fiber supplementation like Metamucil or Citrucel to help prevent constipation - a possible side effect of pain medications.    NAUSEA:  If nauseated from the anesthetic/pain meds; rest in bed, get up cautiously with assistance, and drink clear liquids (juice, tea, broth).    RETURN APPOINTMENT:  Schedule a follow-up visit 2-3 weeks after discharge from the hospital.  Office Phone:  [phone]     CONTACT US IF THE FOLLOWING DEVELOPS:   1. A fever that is above 101     2. If there is a large amount of drainage, bleeding, or swelling.   3. Severe pain that is not relieved by your prescription.   4. Drainage that is thick, cloudy, yellow, green or white.   5. Any other questions not answered by  Frequently Asked Questions  sheet.    I did not personally see or examine the patient  on the day of discharge.  Summary was completed by chart review.   Dano Bedolla PA-C

## 2024-05-09 ENCOUNTER — TELEPHONE (OUTPATIENT)
Dept: SURGERY | Facility: CLINIC | Age: 44
End: 2024-05-09
Payer: COMMERCIAL

## 2024-05-09 DIAGNOSIS — Z98.890 POSTOPERATIVE STATE: Primary | ICD-10-CM

## 2024-05-09 PROCEDURE — 99024 POSTOP FOLLOW-UP VISIT: CPT | Performed by: PHYSICIAN ASSISTANT

## 2024-05-09 NOTE — TELEPHONE ENCOUNTER
[facility]   Postoperative Follow-up Phone Call  -Call to patient to review recent procedure and recovery    Procedure Date:  April/09  Surgeon:  Dr. Monreal  Procedure:  Epigastric hernia repair, primary closure    She is now 4 weeks postop.   She is feeling well, feeling back to normal, no concerns.    Shannon was recommended to call office at any time if ongoing questions/concerns during recovery, but otherwise may follow-up on a prn basis.  Shannon is in agreement with this plan.    Macy Samaniego PA-C    Please route or send letter to:  Primary Care Provider (PCP)

## 2024-11-02 ENCOUNTER — HEALTH MAINTENANCE LETTER (OUTPATIENT)
Age: 44
End: 2024-11-02

## (undated) DEVICE — ESU LIGASURE IMPACT OPEN SEALER/DVDR CVD LG JAW LF4418

## (undated) DEVICE — SU PDS II 0 CT-2 27" Z334H

## (undated) DEVICE — SU VICRYL 4-0 PS-2 18" UND J496H

## (undated) DEVICE — ESU GROUND PAD ADULT W/CORD E7507

## (undated) DEVICE — BAG CLEAR TRASH 1.3M 39X33" P4040C

## (undated) DEVICE — PACK MINOR CUSTOM RIDGES SBA32RMRMA

## (undated) DEVICE — GLOVE BIOGEL PI MICRO SZ 7.5 48575

## (undated) DEVICE — LINEN HALF SHEET 5512

## (undated) DEVICE — SU VICRYL 3-0 PS-2 18" UND J497H

## (undated) DEVICE — DRAPE LAP W/ARMBOARD 29410

## (undated) DEVICE — SUCTION CANISTER MEDIVAC LINER 3000ML W/LID 65651-530

## (undated) DEVICE — GLOVE BIOGEL PI MICRO INDICATOR UNDERGLOVE SZ 8.0 48980

## (undated) DEVICE — NDL 22GA 1.5"

## (undated) DEVICE — SU VICRYL 3-0 TIE 12X18" J904T

## (undated) DEVICE — BLADE CLIPPER 3M 9670

## (undated) DEVICE — TUBING SUCTION 12"X1/4" N612

## (undated) DEVICE — LINEN TOWEL PACK X10 5473

## (undated) DEVICE — LINEN FULL SHEET 5511

## (undated) DEVICE — PREP CHLORAPREP 26ML TINTED HI-LITE ORANGE 930815

## (undated) DEVICE — SU VICRYL 3-0 SH 27" J316H

## (undated) RX ORDER — HYDROMORPHONE HYDROCHLORIDE 1 MG/ML
INJECTION, SOLUTION INTRAMUSCULAR; INTRAVENOUS; SUBCUTANEOUS
Status: DISPENSED
Start: 2024-04-09

## (undated) RX ORDER — ONDANSETRON 2 MG/ML
INJECTION INTRAMUSCULAR; INTRAVENOUS
Status: DISPENSED
Start: 2024-04-09

## (undated) RX ORDER — BUPIVACAINE HYDROCHLORIDE 5 MG/ML
INJECTION, SOLUTION EPIDURAL; INTRACAUDAL
Status: DISPENSED
Start: 2024-04-09

## (undated) RX ORDER — KETOROLAC TROMETHAMINE 30 MG/ML
INJECTION, SOLUTION INTRAMUSCULAR; INTRAVENOUS
Status: DISPENSED
Start: 2024-04-09

## (undated) RX ORDER — FENTANYL CITRATE 50 UG/ML
INJECTION, SOLUTION INTRAMUSCULAR; INTRAVENOUS
Status: DISPENSED
Start: 2024-04-09

## (undated) RX ORDER — ACETAMINOPHEN 325 MG/1
TABLET ORAL
Status: DISPENSED
Start: 2024-04-09

## (undated) RX ORDER — ALBUTEROL SULFATE 0.83 MG/ML
SOLUTION RESPIRATORY (INHALATION)
Status: DISPENSED
Start: 2024-04-09

## (undated) RX ORDER — METHADONE HYDROCHLORIDE 10 MG/ML
INJECTION, SOLUTION INTRAMUSCULAR; INTRAVENOUS; SUBCUTANEOUS
Status: DISPENSED
Start: 2024-04-09